# Patient Record
Sex: FEMALE | Race: WHITE | Employment: PART TIME | URBAN - METROPOLITAN AREA
[De-identification: names, ages, dates, MRNs, and addresses within clinical notes are randomized per-mention and may not be internally consistent; named-entity substitution may affect disease eponyms.]

---

## 2017-01-10 ASSESSMENT — PAIN SCALES - GENERAL: PAINLEVEL_OUTOF10: 9

## 2017-01-10 ASSESSMENT — PAIN DESCRIPTION - DESCRIPTORS: DESCRIPTORS: ACHING

## 2017-01-11 ENCOUNTER — HOSPITAL ENCOUNTER (EMERGENCY)
Age: 22
Discharge: HOME | End: 2017-01-11
Payer: COMMERCIAL

## 2017-01-11 VITALS
HEART RATE: 100 BPM | OXYGEN SATURATION: 100 % | BODY MASS INDEX: 22.71 KG/M2 | SYSTOLIC BLOOD PRESSURE: 106 MMHG | DIASTOLIC BLOOD PRESSURE: 68 MMHG | WEIGHT: 133 LBS | TEMPERATURE: 98.7 F | HEIGHT: 64 IN | RESPIRATION RATE: 18 BRPM

## 2017-01-11 DIAGNOSIS — J10.1 INFLUENZA A: Primary | ICD-10-CM

## 2017-01-11 LAB
ALBUMIN SERPL-MCNC: 3.6 G/DL (ref 3.9–4.9)
ALP BLD-CCNC: 42 U/L (ref 40–130)
ALT SERPL-CCNC: 10 U/L (ref 0–33)
ANION GAP SERPL CALCULATED.3IONS-SCNC: 12 MEQ/L (ref 7–13)
AST SERPL-CCNC: 15 U/L (ref 0–35)
BACTERIA: NORMAL /HPF
BASOPHILS ABSOLUTE: 0 K/UL (ref 0–0.2)
BASOPHILS RELATIVE PERCENT: 0.3 %
BILIRUB SERPL-MCNC: 0.2 MG/DL (ref 0–1.2)
BILIRUBIN URINE: NEGATIVE
BLOOD, URINE: NEGATIVE
BUN BLDV-MCNC: 4 MG/DL (ref 6–20)
CALCIUM SERPL-MCNC: 8.4 MG/DL (ref 8.6–10.2)
CHLORIDE BLD-SCNC: 99 MEQ/L (ref 98–107)
CLARITY: ABNORMAL
CO2: 21 MEQ/L (ref 22–29)
COLOR: YELLOW
CREAT SERPL-MCNC: 0.45 MG/DL (ref 0.5–0.9)
EOSINOPHILS ABSOLUTE: 0 K/UL (ref 0–0.7)
EOSINOPHILS RELATIVE PERCENT: 0.1 %
EPITHELIAL CELLS, UA: NORMAL /HPF
GFR AFRICAN AMERICAN: >60
GFR NON-AFRICAN AMERICAN: >60
GLOBULIN: 2.4 G/DL (ref 2.3–3.5)
GLUCOSE BLD-MCNC: 98 MG/DL (ref 74–109)
GLUCOSE URINE: NEGATIVE MG/DL
HCT VFR BLD CALC: 31.5 % (ref 37–47)
HEMOGLOBIN: 11.2 G/DL (ref 12–16)
KETONES, URINE: NEGATIVE MG/DL
LEUKOCYTE ESTERASE, URINE: ABNORMAL
LYMPHOCYTES ABSOLUTE: 0.7 K/UL (ref 1–4.8)
LYMPHOCYTES RELATIVE PERCENT: 7.4 %
MCH RBC QN AUTO: 31.9 PG (ref 27–31.3)
MCHC RBC AUTO-ENTMCNC: 35.5 % (ref 33–37)
MCV RBC AUTO: 90 FL (ref 82–100)
MONOCYTES ABSOLUTE: 1.3 K/UL (ref 0.2–0.8)
MONOCYTES RELATIVE PERCENT: 13.8 %
NEUTROPHILS ABSOLUTE: 7.2 K/UL (ref 1.4–6.5)
NEUTROPHILS RELATIVE PERCENT: 78.4 %
NITRITE, URINE: NEGATIVE
PDW BLD-RTO: 14.9 % (ref 11.5–14.5)
PH UA: 6 (ref 5–9)
PLATELET # BLD: 167 K/UL (ref 130–400)
POTASSIUM SERPL-SCNC: 3.4 MEQ/L (ref 3.5–5.1)
PROTEIN UA: NEGATIVE MG/DL
RAPID INFLUENZA  B AGN: NEGATIVE
RAPID INFLUENZA A AGN: POSITIVE
RBC # BLD: 3.5 M/UL (ref 4.2–5.4)
RBC UA: NORMAL /HPF (ref 0–2)
S PYO AG THROAT QL: NEGATIVE
SODIUM BLD-SCNC: 132 MEQ/L (ref 132–144)
SPECIFIC GRAVITY UA: 1.01 (ref 1–1.03)
TOTAL PROTEIN: 6 G/DL (ref 6.4–8.1)
URINE REFLEX TO CULTURE: YES
UROBILINOGEN, URINE: 1 E.U./DL
WBC # BLD: 9.1 K/UL (ref 4.8–10.8)
WBC UA: NORMAL /HPF (ref 0–5)

## 2017-01-11 PROCEDURE — 85025 COMPLETE CBC W/AUTO DIFF WBC: CPT

## 2017-01-11 PROCEDURE — 87880 STREP A ASSAY W/OPTIC: CPT

## 2017-01-11 PROCEDURE — 81001 URINALYSIS AUTO W/SCOPE: CPT

## 2017-01-11 PROCEDURE — 87081 CULTURE SCREEN ONLY: CPT

## 2017-01-11 PROCEDURE — 6370000000 HC RX 637 (ALT 250 FOR IP): Performed by: PHYSICIAN ASSISTANT

## 2017-01-11 PROCEDURE — 36415 COLL VENOUS BLD VENIPUNCTURE: CPT

## 2017-01-11 PROCEDURE — 2580000003 HC RX 258: Performed by: PHYSICIAN ASSISTANT

## 2017-01-11 PROCEDURE — 99283 EMERGENCY DEPT VISIT LOW MDM: CPT

## 2017-01-11 PROCEDURE — 87040 BLOOD CULTURE FOR BACTERIA: CPT

## 2017-01-11 PROCEDURE — 87086 URINE CULTURE/COLONY COUNT: CPT

## 2017-01-11 PROCEDURE — 80053 COMPREHEN METABOLIC PANEL: CPT

## 2017-01-11 PROCEDURE — 86403 PARTICLE AGGLUT ANTBDY SCRN: CPT

## 2017-01-11 RX ORDER — 0.9 % SODIUM CHLORIDE 0.9 %
1000 INTRAVENOUS SOLUTION INTRAVENOUS ONCE
Status: COMPLETED | OUTPATIENT
Start: 2017-01-11 | End: 2017-01-11

## 2017-01-11 RX ORDER — ACETAMINOPHEN 500 MG
1000 TABLET ORAL ONCE
Status: COMPLETED | OUTPATIENT
Start: 2017-01-11 | End: 2017-01-11

## 2017-01-11 RX ORDER — OSELTAMIVIR PHOSPHATE 75 MG/1
75 CAPSULE ORAL 2 TIMES DAILY
Qty: 10 CAPSULE | Refills: 0 | Status: SHIPPED | OUTPATIENT
Start: 2017-01-11 | End: 2017-01-16

## 2017-01-11 RX ORDER — OSELTAMIVIR PHOSPHATE 75 MG/1
75 CAPSULE ORAL ONCE
Status: COMPLETED | OUTPATIENT
Start: 2017-01-11 | End: 2017-01-11

## 2017-01-11 RX ORDER — ACETAMINOPHEN 500 MG
1000 TABLET ORAL EVERY 8 HOURS PRN
Qty: 120 TABLET | Refills: 3 | Status: SHIPPED | OUTPATIENT
Start: 2017-01-11 | End: 2018-10-30 | Stop reason: ALTCHOICE

## 2017-01-11 RX ADMIN — SODIUM CHLORIDE 1000 ML: 9 INJECTION, SOLUTION INTRAVENOUS at 00:24

## 2017-01-11 RX ADMIN — ACETAMINOPHEN 1000 MG: 500 TABLET ORAL at 00:24

## 2017-01-11 RX ADMIN — OSELTAMIVIR PHOSPHATE 75 MG: 75 CAPSULE ORAL at 00:53

## 2017-01-11 ASSESSMENT — ENCOUNTER SYMPTOMS
COUGH: 1
DIARRHEA: 0
VOMITING: 1
TROUBLE SWALLOWING: 0
RHINORRHEA: 1
SORE THROAT: 1
APNEA: 0
ABDOMINAL PAIN: 0
EYE PAIN: 0
WHEEZING: 0
NAUSEA: 1
ALLERGIC/IMMUNOLOGIC NEGATIVE: 1
COLOR CHANGE: 0

## 2017-01-11 ASSESSMENT — PAIN SCALES - GENERAL: PAINLEVEL_OUTOF10: 9

## 2017-01-12 LAB — URINE CULTURE, ROUTINE: NORMAL

## 2017-01-13 LAB — S PYO THROAT QL CULT: NORMAL

## 2017-01-16 LAB
BLOOD CULTURE, ROUTINE: NORMAL
CULTURE, BLOOD 2: NORMAL

## 2018-04-27 PROBLEM — G89.29 CHRONIC MIDLINE LOW BACK PAIN WITHOUT SCIATICA: Status: ACTIVE | Noted: 2018-04-27

## 2018-04-27 PROBLEM — M54.50 CHRONIC MIDLINE LOW BACK PAIN WITHOUT SCIATICA: Status: ACTIVE | Noted: 2018-04-27

## 2018-09-29 ENCOUNTER — HOSPITAL ENCOUNTER (OUTPATIENT)
Dept: MRI IMAGING | Age: 23
Discharge: HOME | End: 2018-10-01
Payer: COMMERCIAL

## 2018-09-29 DIAGNOSIS — M54.50 CHRONIC MIDLINE LOW BACK PAIN WITHOUT SCIATICA: ICD-10-CM

## 2018-09-29 DIAGNOSIS — R20.0 RIGHT LEG NUMBNESS: ICD-10-CM

## 2018-09-29 DIAGNOSIS — G89.29 CHRONIC MIDLINE LOW BACK PAIN WITHOUT SCIATICA: ICD-10-CM

## 2018-09-29 PROCEDURE — 72148 MRI LUMBAR SPINE W/O DYE: CPT

## 2018-10-30 ENCOUNTER — OFFICE VISIT (OUTPATIENT)
Dept: FAMILY MEDICINE CLINIC | Age: 23
End: 2018-10-30
Payer: COMMERCIAL

## 2018-10-30 VITALS
SYSTOLIC BLOOD PRESSURE: 112 MMHG | OXYGEN SATURATION: 97 % | WEIGHT: 153.8 LBS | TEMPERATURE: 97.7 F | HEART RATE: 103 BPM | RESPIRATION RATE: 12 BRPM | BODY MASS INDEX: 27.25 KG/M2 | HEIGHT: 63 IN | DIASTOLIC BLOOD PRESSURE: 76 MMHG

## 2018-10-30 DIAGNOSIS — J06.9 VIRAL URI WITH COUGH: Primary | ICD-10-CM

## 2018-10-30 PROCEDURE — 1036F TOBACCO NON-USER: CPT | Performed by: NURSE PRACTITIONER

## 2018-10-30 PROCEDURE — 99203 OFFICE O/P NEW LOW 30 MIN: CPT | Performed by: NURSE PRACTITIONER

## 2018-10-30 PROCEDURE — G8484 FLU IMMUNIZE NO ADMIN: HCPCS | Performed by: NURSE PRACTITIONER

## 2018-10-30 PROCEDURE — G8427 DOCREV CUR MEDS BY ELIG CLIN: HCPCS | Performed by: NURSE PRACTITIONER

## 2018-10-30 PROCEDURE — G8419 CALC BMI OUT NRM PARAM NOF/U: HCPCS | Performed by: NURSE PRACTITIONER

## 2018-10-30 RX ORDER — BUTALBITAL, ACETAMINOPHEN AND CAFFEINE 50; 325; 40 MG/1; MG/1; MG/1
TABLET ORAL
Refills: 0 | COMMUNITY
Start: 2018-08-31

## 2018-10-30 RX ORDER — FLUTICASONE PROPIONATE 50 MCG
1 SPRAY, SUSPENSION (ML) NASAL 2 TIMES DAILY
Qty: 1 BOTTLE | Refills: 1 | Status: SHIPPED | OUTPATIENT
Start: 2018-10-30

## 2018-10-30 RX ORDER — CETIRIZINE HYDROCHLORIDE, PSEUDOEPHEDRINE HYDROCHLORIDE 5; 120 MG/1; MG/1
1 TABLET, FILM COATED, EXTENDED RELEASE ORAL 2 TIMES DAILY
Qty: 60 TABLET | Refills: 0 | Status: SHIPPED | OUTPATIENT
Start: 2018-10-30 | End: 2018-11-29

## 2018-10-30 RX ORDER — DEXTROMETHORPHAN HYDROBROMIDE AND PROMETHAZINE HYDROCHLORIDE 15; 6.25 MG/5ML; MG/5ML
5 SYRUP ORAL 4 TIMES DAILY PRN
Qty: 180 ML | Refills: 0 | Status: SHIPPED | OUTPATIENT
Start: 2018-10-30 | End: 2018-11-06

## 2018-10-30 RX ORDER — BENZONATATE 100 MG/1
100 CAPSULE ORAL 3 TIMES DAILY PRN
Qty: 21 CAPSULE | Refills: 0 | Status: SHIPPED | OUTPATIENT
Start: 2018-10-30 | End: 2018-11-06

## 2018-10-30 ASSESSMENT — ENCOUNTER SYMPTOMS
SHORTNESS OF BREATH: 0
HOARSE VOICE: 1
SINUS PRESSURE: 1
COUGH: 1
VOMITING: 0
TROUBLE SWALLOWING: 0
SINUS COMPLAINT: 1
DIARRHEA: 0
ABDOMINAL PAIN: 0
EYE PAIN: 0
EYE REDNESS: 0
EYE DISCHARGE: 0
SWOLLEN GLANDS: 0
EYE ITCHING: 0
PHOTOPHOBIA: 0
RHINORRHEA: 1
NAUSEA: 0
SORE THROAT: 0

## 2018-10-30 ASSESSMENT — PATIENT HEALTH QUESTIONNAIRE - PHQ9
2. FEELING DOWN, DEPRESSED OR HOPELESS: 0
1. LITTLE INTEREST OR PLEASURE IN DOING THINGS: 0
SUM OF ALL RESPONSES TO PHQ QUESTIONS 1-9: 0
SUM OF ALL RESPONSES TO PHQ QUESTIONS 1-9: 0
SUM OF ALL RESPONSES TO PHQ9 QUESTIONS 1 & 2: 0

## 2018-10-30 NOTE — LETTER
[facility]  [address] [facility] [address]  Phone: [phone]  Fax: [phone]    JES Escoto CNP        October 30, 2018     Patient: Alejandra Black   YOB: 1995   Date of Visit: 10/30/2018       To Whom it May Concern:    Yas Askew was seen in my clinic on 10/30/2018. She may return to work on 10/31/2018. If you have any questions or concerns, please don't hesitate to call.     Sincerely,         JES Escoto CNP

## 2018-10-30 NOTE — PROGRESS NOTES
Subjective     Monse Lepe 24 y.o. female presents 10/30/18 with   Chief Complaint   Patient presents with    Cough     x 2 days.  Congestion    Headache       Sinus Problem   This is a new problem. Episode onset: about 4 days ago; cough about 2 days ago. The problem has been gradually worsening since onset. There has been no fever. She is experiencing no pain. Associated symptoms include chills, congestion, coughing, diaphoresis, headaches, a hoarse voice and sinus pressure (Slight). Pertinent negatives include no ear pain, neck pain, shortness of breath, sneezing, sore throat or swollen glands. Treatments tried: Dayquil/Nyquil, Vitamin C. The treatment provided mild relief. Reviewed the following history:    Past Medical History:   Diagnosis Date    Endometriosis      Past Surgical History:   Procedure Laterality Date    APPENDECTOMY       History reviewed. No pertinent family history. Allergies   Allergen Reactions    Acetaminophen-Pamabrom Itching    Midol [Acetaminophen]     Midol [Ibuprofen]        Current Outpatient Prescriptions   Medication Sig Dispense Refill    butalbital-acetaminophen-caffeine (FIORICET, ESGIC) -40 MG per tablet TK ONE T PO BID PRN  0    medroxyPROGESTERone (DEPO-PROVERA) 400 MG/ML SUSP Inject 400 mg into the muscle      fluticasone (FLONASE) 50 MCG/ACT nasal spray 1 spray by Nasal route 2 times daily 1 Bottle 1    cetirizine-psuedoephedrine (ZYRTEC-D) 5-120 MG per extended release tablet Take 1 tablet by mouth 2 times daily 60 tablet 0    promethazine-dextromethorphan (PROMETHAZINE-DM) 6.25-15 MG/5ML syrup Take 5 mLs by mouth 4 times daily as needed for Cough 180 mL 0    benzonatate (TESSALON PERLES) 100 MG capsule Take 1 capsule by mouth 3 times daily as needed for Cough 21 capsule 0    gabapentin (NEURONTIN) 300 MG capsule Take 1 capsule by mouth daily as needed (pain) for up to 30 days. . 30 capsule 0    tiZANidine (ZANAFLEX) 4 MG tablet TAKE 1

## 2021-03-23 ENCOUNTER — HOSPITAL ENCOUNTER (EMERGENCY)
Age: 26
Discharge: HOME | End: 2021-03-23
Payer: COMMERCIAL

## 2021-03-23 VITALS
TEMPERATURE: 98 F | HEIGHT: 63 IN | BODY MASS INDEX: 26.93 KG/M2 | HEART RATE: 71 BPM | SYSTOLIC BLOOD PRESSURE: 111 MMHG | DIASTOLIC BLOOD PRESSURE: 78 MMHG | RESPIRATION RATE: 19 BRPM | WEIGHT: 152 LBS

## 2021-03-23 DIAGNOSIS — B37.31 VAGINAL CANDIDIASIS: Primary | ICD-10-CM

## 2021-03-23 LAB
BACTERIA: NEGATIVE /HPF
BILIRUBIN URINE: NEGATIVE
BLOOD, URINE: NEGATIVE
CLARITY: CLEAR
COLOR: YELLOW
EPITHELIAL CELLS, UA: ABNORMAL /HPF (ref 0–5)
GLUCOSE URINE: NEGATIVE MG/DL
HCG, URINE, POC: NEGATIVE
HYALINE CASTS: ABNORMAL /HPF (ref 0–5)
KETONES, URINE: ABNORMAL MG/DL
LEUKOCYTE ESTERASE, URINE: ABNORMAL
Lab: 0
NEGATIVE QC PASS/FAIL: NORMAL
NITRITE, URINE: NEGATIVE
PH UA: 7 (ref 5–9)
POSITIVE QC PASS/FAIL: NORMAL
PROTEIN UA: ABNORMAL MG/DL
RBC UA: ABNORMAL /HPF (ref 0–5)
SPECIFIC GRAVITY UA: 1.03 (ref 1–1.03)
URINE REFLEX TO CULTURE: YES
UROBILINOGEN, URINE: 1 E.U./DL
WBC UA: ABNORMAL /HPF (ref 0–5)

## 2021-03-23 PROCEDURE — 87077 CULTURE AEROBIC IDENTIFY: CPT

## 2021-03-23 PROCEDURE — 6370000000 HC RX 637 (ALT 250 FOR IP): Performed by: NURSE PRACTITIONER

## 2021-03-23 PROCEDURE — 87086 URINE CULTURE/COLONY COUNT: CPT

## 2021-03-23 PROCEDURE — 99283 EMERGENCY DEPT VISIT LOW MDM: CPT

## 2021-03-23 PROCEDURE — 81001 URINALYSIS AUTO W/SCOPE: CPT

## 2021-03-23 RX ORDER — FLUCONAZOLE 200 MG/1
200 TABLET ORAL DAILY
Qty: 1 TABLET | Refills: 0 | Status: SHIPPED | OUTPATIENT
Start: 2021-03-23

## 2021-03-23 RX ORDER — FLUCONAZOLE 100 MG/1
200 TABLET ORAL ONCE
Status: COMPLETED | OUTPATIENT
Start: 2021-03-23 | End: 2021-03-23

## 2021-03-23 RX ADMIN — FLUCONAZOLE 200 MG: 100 TABLET ORAL at 19:24

## 2021-03-23 ASSESSMENT — ENCOUNTER SYMPTOMS
TROUBLE SWALLOWING: 0
WHEEZING: 0
SORE THROAT: 0
DIARRHEA: 0
CHEST TIGHTNESS: 0
NAUSEA: 0
SINUS PAIN: 0
BACK PAIN: 0
VOMITING: 0
CONSTIPATION: 0
ABDOMINAL DISTENTION: 0
SINUS PRESSURE: 0
RHINORRHEA: 0
SHORTNESS OF BREATH: 0
ABDOMINAL PAIN: 0
COLOR CHANGE: 0
COUGH: 0

## 2021-03-23 NOTE — ED NOTES
D/C instructions given to patient no questions ask. Patient verbalized understanding and ambulated from ED without any complications.      Daniel Tobar RN  03/23/21 1996

## 2021-03-23 NOTE — ED PROVIDER NOTES
[address] [facility] ED  EMERGENCY DEPARTMENT ENCOUNTER      Pt Name: Homar Fernandez  MRN: [id number]  [place] 1995  Date of evaluation: 3/23/2021  Provider: JES Garcia CNP    CHIEF COMPLAINT       Chief Complaint   Patient presents with    Vaginitis     pt has a rash and discharge. HISTORY OF PRESENT ILLNESS   (Location/Symptom, Timing/Onset,Context/Setting, Quality, Duration, Modifying Factors, Severity)  Note limiting factors. Homar Fernandez is a 31 y.o. female who presents to the emergency department for complaint of 5 days of developing rash on the vaginal area with white discharge. Patient states that she is certain she has a yeast infection. She states she had 1 month ago which she treated with Diflucan successfully. She states however that she recently had a tooth infection and took 7 days of amoxicillin and the symptoms started on the fifth and sixth day of the amoxicillin. She states she is a history of getting vaginal yeast infections with the use of antibiotics. She denies any abdominal pain or discomfort denies dysuria urgency or frequency denies any symptoms of urinary tract infection. She denies any recent diarrhea. She states that any of the discomfort is all located in the vaginal area due to the rash and redness that has occurred with prior yeast infections. She states she was recently seen by her OB/GYN just prior to starting the antibiotics and had STD testing for examination and denies any exposure to STDs at this time. Nursing Notes were reviewed. REVIEW OF SYSTEMS    (2-9 systems for level 4, 10 or more for level 5)     Review of Systems   Constitutional: Negative for activity change, appetite change, chills, diaphoresis, fatigue and fever. HENT: Negative for congestion, ear pain, postnasal drip, rhinorrhea, sinus pressure, sinus pain, sore throat and trouble swallowing. Eyes: Negative for visual disturbance.    Respiratory: Negative for cough, chest tightness, shortness of breath and wheezing. Cardiovascular: Negative for chest pain and palpitations. Gastrointestinal: Negative for abdominal distention, abdominal pain, constipation, diarrhea, nausea and vomiting. Genitourinary: Positive for vaginal discharge and vaginal pain. Negative for decreased urine volume, difficulty urinating, dysuria, flank pain, frequency, hematuria, menstrual problem, urgency and vaginal bleeding. Musculoskeletal: Negative for arthralgias, back pain, myalgias, neck pain and neck stiffness. Skin: Negative for color change and rash. Neurological: Negative for dizziness, tremors, seizures, syncope, speech difficulty, weakness, light-headedness, numbness and headaches. Except as noted above the remainder of the review of systems was reviewed and negative.        PAST MEDICAL HISTORY     Past Medical History:   Diagnosis Date    Endometriosis      Past Surgical History:   Procedure Laterality Date    APPENDECTOMY       Social History     Socioeconomic History    Marital status: Single     Spouse name: None    Number of children: None    Years of education: None    Highest education level: None   Occupational History    None   Social Needs    Financial resource strain: None    Food insecurity     Worry: None     Inability: None    Transportation needs     Medical: None     Non-medical: None   Tobacco Use    Smoking status: Former Smoker    Smokeless tobacco: Never Used   Substance and Sexual Activity    Alcohol use: No    Drug use: No    Sexual activity: Yes     Partners: Male   Lifestyle    Physical activity     Days per week: None     Minutes per session: None    Stress: None   Relationships    Social connections     Talks on phone: None     Gets together: None     Attends Scientologist service: None     Active member of club or organization: None     Attends meetings of clubs or organizations: None     Relationship status: None    Intimate partner violence     Fear of current or ex partner: None     Emotionally abused: None     Physically abused: None     Forced sexual activity: None   Other Topics Concern    None   Social History Narrative    None       SCREENINGS             PHYSICAL EXAM    (up to 7 for level 4, 8 or more for level 5)     ED Triage Vitals [03/23/21 1837]   BP Temp Temp src Pulse Resp SpO2 Height Weight   111/78 98 °F (36.7 °C) -- 71 19 -- 5' 3\" (1.6 m) 152 lb (68.9 kg)       Physical Exam  Constitutional:       General: She is not in acute distress. Appearance: Normal appearance. She is normal weight. She is not ill-appearing, toxic-appearing or diaphoretic. HENT:      Head: Normocephalic and atraumatic. Right Ear: External ear normal.      Left Ear: External ear normal.   Eyes:      General:         Right eye: No discharge. Left eye: No discharge. Conjunctiva/sclera: Conjunctivae normal.      Pupils: Pupils are equal, round, and reactive to light. Neck:      Musculoskeletal: Normal range of motion and neck supple. No neck rigidity or muscular tenderness. Cardiovascular:      Rate and Rhythm: Normal rate and regular rhythm. Pulses: Normal pulses. Pulmonary:      Effort: Pulmonary effort is normal.      Breath sounds: Normal breath sounds. Abdominal:      General: Bowel sounds are normal. There is no distension. Palpations: Abdomen is soft. There is no mass. Tenderness: There is no abdominal tenderness. There is no guarding or rebound. Hernia: No hernia is present. Musculoskeletal: Normal range of motion. General: No tenderness or signs of injury. Skin:     General: Skin is warm and dry. Capillary Refill: Capillary refill takes less than 2 seconds. Neurological:      General: No focal deficit present. Mental Status: She is alert. Mental status is at baseline. Cranial Nerves: No cranial nerve deficit. Sensory: No sensory deficit.       Motor: No weakness. Coordination: Coordination normal.         RESULTS     EKG: All EKG's are interpreted by the Emergency Department Physician who either signs or Co-signsthis chart in the absence of a cardiologist.        RADIOLOGY:   Lalla Cass such as CT, Ultrasound and MRI are read by the radiologist. Plain radiographic images are visualized and preliminarily interpreted by the emergency physician with the below findings:        Interpretation per the Radiologist below, if available at the time ofthis note:    No orders to display         ED BEDSIDE ULTRASOUND:   Performed by ED Physician - none    LABS:  Labs Reviewed   URINE RT REFLEX TO CULTURE   POC PREGNANCY UR-QUAL       All other labs were within normal range or not returned as of this dictation. EMERGENCY DEPARTMENT COURSE and DIFFERENTIAL DIAGNOSIS/MDM:   Vitals:    Vitals:    03/23/21 1837   BP: 111/78   Pulse: 71   Resp: 19   Temp: 98 °F (36.7 °C)   Weight: 152 lb (68.9 kg)   Height: 5' 3\" (1.6 m)            MDM patient is afebrile nontoxic no acute distress hemodynamically stable. Patient does not have symptoms of a urinary tract infection she does however have classic symptoms presentation of vaginal yeast infection. Vaginal examination was not performed as the patient recently had one OB/GYN and given the presentation and history of yeast infections her symptoms are highly suggestive of a yeast infection secondary to these antibiotics. Patient states she will follow up with her OB/GYN for further evaluation of necessary. She be given a dose of Diflucan in the ER and sent home with a second dose to take in 3 days. Follow-up with primary care and OB/GYN this is possible return to the ER for any onset of new concerns and worsening condition specifically abdominal pain or fevers of abdominal pain. Patient verbalized understandable given instruction education.     CRITICAL CARE TIME       CONSULTS:  None    PROCEDURES:  Unless otherwise noted below, none     Procedures    FINAL IMPRESSION      1.  Vaginal candidiasis          DISPOSITION/PLAN   DISPOSITION Discharge - Pending Orders Complete 03/23/2021 07:12:09 PM      PATIENT REFERRED TO:  Farhad Denis MD  [address] [address] [place]  [phone]    Call in 1 day        DISCHARGE MEDICATIONS:  New Prescriptions    FLUCONAZOLE (DIFLUCAN) 200 MG TABLET    Take 1 tablet by mouth daily In 3 days for continued symtoms          (Please notethat portions of this note were completed with a voice recognition program.  Efforts were made to edit the dictations but occasionally words are mis-transcribed.)    JES Cuevas CNP (electronically signed)  Attending Emergency Physician         JES Cuevas CNP  03/23/21 5990

## 2021-03-25 LAB
ORGANISM: ABNORMAL
URINE CULTURE, ROUTINE: ABNORMAL

## 2021-03-31 ENCOUNTER — HOSPITAL ENCOUNTER (EMERGENCY)
Age: 26
Discharge: HOME | End: 2021-03-31
Payer: COMMERCIAL

## 2021-03-31 ENCOUNTER — APPOINTMENT (OUTPATIENT)
Dept: GENERAL RADIOLOGY | Age: 26
End: 2021-03-31
Payer: COMMERCIAL

## 2021-03-31 VITALS
SYSTOLIC BLOOD PRESSURE: 118 MMHG | BODY MASS INDEX: 30.04 KG/M2 | TEMPERATURE: 98.5 F | HEART RATE: 74 BPM | RESPIRATION RATE: 18 BRPM | HEIGHT: 60 IN | DIASTOLIC BLOOD PRESSURE: 74 MMHG | WEIGHT: 153 LBS | OXYGEN SATURATION: 100 %

## 2021-03-31 DIAGNOSIS — M79.602 LEFT ARM PAIN: Primary | ICD-10-CM

## 2021-03-31 PROCEDURE — 99284 EMERGENCY DEPT VISIT MOD MDM: CPT

## 2021-03-31 PROCEDURE — 6370000000 HC RX 637 (ALT 250 FOR IP): Performed by: STUDENT IN AN ORGANIZED HEALTH CARE EDUCATION/TRAINING PROGRAM

## 2021-03-31 PROCEDURE — 73030 X-RAY EXAM OF SHOULDER: CPT

## 2021-03-31 RX ORDER — METHYLPREDNISOLONE 4 MG/1
TABLET ORAL
Qty: 21 TABLET | Refills: 0 | Status: SHIPPED | OUTPATIENT
Start: 2021-03-31 | End: 2021-04-06

## 2021-03-31 RX ORDER — HYDROCODONE BITARTRATE AND ACETAMINOPHEN 5; 325 MG/1; MG/1
1 TABLET ORAL EVERY 6 HOURS PRN
Qty: 10 TABLET | Refills: 0 | Status: SHIPPED | OUTPATIENT
Start: 2021-03-31 | End: 2021-04-03

## 2021-03-31 RX ORDER — TRAMADOL HYDROCHLORIDE 50 MG/1
50 TABLET ORAL ONCE
Status: COMPLETED | OUTPATIENT
Start: 2021-03-31 | End: 2021-03-31

## 2021-03-31 RX ADMIN — TRAMADOL HYDROCHLORIDE 50 MG: 50 TABLET, FILM COATED ORAL at 21:21

## 2021-03-31 ASSESSMENT — PAIN DESCRIPTION - PAIN TYPE: TYPE: ACUTE PAIN

## 2021-03-31 ASSESSMENT — PAIN DESCRIPTION - DESCRIPTORS: DESCRIPTORS: ACHING

## 2021-03-31 ASSESSMENT — PAIN SCALES - GENERAL: PAINLEVEL_OUTOF10: 10

## 2021-04-01 ASSESSMENT — ENCOUNTER SYMPTOMS
VOMITING: 0
COUGH: 0
WHEEZING: 0
SHORTNESS OF BREATH: 0
ABDOMINAL PAIN: 0
NAUSEA: 0
PHOTOPHOBIA: 0

## 2021-04-01 NOTE — ED TRIAGE NOTES
Patient to ED with c/o left arm pain   Onset of pain 1630  Patient states she was going to work and arm started hurting and \"turned blue\".   Patient rates pain 10/10  Patient denies taking any meds to relieve pain  Arm is pink warm and dry

## 2021-04-01 NOTE — ED PROVIDER NOTES
[address] [facility] ED  EMERGENCY DEPARTMENT ENCOUNTER      Pt Name: Kerrie Anthony  MRN: [id number]  [place] 1995  Date of evaluation: 3/31/2021  Provider: Hunter Crowe PA-C    CHIEF COMPLAINT       Chief Complaint   Patient presents with    Arm Pain         HISTORY OF PRESENT ILLNESS   (Location/Symptom, Timing/Onset, Context/Setting, Quality, Duration, Modifying Factors, Severity)  Note limiting factors. Kerrie Anthony is a 31 y.o. female who per chart review has pmhx of chronic back pain, endometriosis, appendectomy presents to the emergency department with gradual onset, constant, moderate, 9/10 diffuse L arm pain worse in the shoulder region that began around 1630 before going to work. She states it looked as if the arm \"changed colors\" for a short period of time and then spontaneously returned to normal pink color. She states she had a tingling sensation in the L hand as well that has since resolved. Pain is worse with movement and palpation. She has not tried anything for her sx. No hx of similar. No recent injuries. She works as a nurses aid and lifts patients. She denies hx of blood clots. No recent travel, hospitalizations, periods of immobilization or surgeries. She denies numbness, weakness, fever, chills, redness, swelling, deformities, neck or back pain, chest pain, sob. HPI    Nursing Notes were reviewed. REVIEW OF SYSTEMS    (2-9 systems for level 4, 10 or more for level 5)     Review of Systems   Constitutional: Negative for chills and fever. HENT: Negative for congestion. Eyes: Negative for photophobia. Respiratory: Negative for cough, shortness of breath and wheezing. Cardiovascular: Negative for chest pain and palpitations. Gastrointestinal: Negative for abdominal pain, nausea and vomiting. Genitourinary: Negative for dysuria, frequency and hematuria.    Musculoskeletal: Negative for myalgias.        + L arm pain   Allergic/Immunologic: Negative for Substance and Sexual Activity    Alcohol use: No    Drug use: No    Sexual activity: Yes     Partners: Male   Lifestyle    Physical activity     Days per week: None     Minutes per session: None    Stress: None   Relationships    Social connections     Talks on phone: None     Gets together: None     Attends Denominational service: None     Active member of club or organization: None     Attends meetings of clubs or organizations: None     Relationship status: None    Intimate partner violence     Fear of current or ex partner: None     Emotionally abused: None     Physically abused: None     Forced sexual activity: None   Other Topics Concern    None   Social History Narrative    None       SCREENINGS                        PHYSICAL EXAM    (up to 7 for level 4, 8 or more for level 5)     ED Triage Vitals [03/31/21 2108]   BP Temp Temp Source Pulse Resp SpO2 Height Weight   112/67 98.5 °F (36.9 °C) Oral 74 18 100 % 5' (1.524 m) 153 lb (69.4 kg)       Physical Exam  Constitutional:       General: She is not in acute distress. Appearance: She is well-developed. She is not toxic-appearing. HENT:      Head: Normocephalic and atraumatic. Nose: Nose normal.      Mouth/Throat:      Mouth: Mucous membranes are moist.   Eyes:      Pupils: Pupils are equal, round, and reactive to light. Neck:      Musculoskeletal: Normal range of motion. Cardiovascular:      Rate and Rhythm: Normal rate and regular rhythm. Heart sounds: No murmur. No friction rub. No gallop. Pulmonary:      Effort: Pulmonary effort is normal.      Breath sounds: Normal breath sounds. Abdominal:      General: There is no distension. Tenderness: There is no abdominal tenderness. Musculoskeletal:         General: No swelling. Left shoulder: She exhibits tenderness (diffuse).  She exhibits normal range of motion, no bony tenderness, no swelling, no effusion, no crepitus, no deformity, no laceration, no pain, no spasm, normal pulse and normal strength. Left elbow: Normal.      Left wrist: Normal.      Comments: LUE: no deformity, color change, erythema, warmth, streaking, swelling. Pulses 2+. Cap refill <2 seconds. Pt has full ROM of all upper extremity joints. Strength 5/5. Sensation intact. Good  strength. Neurovascularly intact. Diffuse ttp of shoulder. Skin:     General: Skin is warm and dry. Neurological:      Mental Status: She is alert and oriented to person, place, and time. DIAGNOSTIC RESULTS     EKG: All EKG's are interpreted by the Emergency Department Physician who either signs or Co-signs this chart in the absence of a cardiologist.        RADIOLOGY:   Non-plain film images such as CT, Ultrasound and MRI are read by the radiologist. Plain radiographic images are visualized and preliminarily interpreted by the emergency physician with the below findings:        Interpretation per the Radiologist below, if available at the time of this note:    XR SHOULDER LEFT (MIN 2 VIEWS)    (Results Pending)         ED BEDSIDE ULTRASOUND:   Performed by ED Physician - none    LABS:  Labs Reviewed - No data to display    All other labs were within normal range or not returned as of this dictation. EMERGENCY DEPARTMENT COURSE and DIFFERENTIAL DIAGNOSIS/MDM:   Vitals:    Vitals:    03/31/21 2108 03/31/21 2219   BP: 112/67 118/74   Pulse: 74 74   Resp: 18 18   Temp: 98.5 °F (36.9 °C) 98.5 °F (36.9 °C)   TempSrc: Oral Oral   SpO2: 100% 100%   Weight: 153 lb (69.4 kg)    Height: 5' (1.524 m)        MDM    Pt is a 32 yo F who presents to the ED with L arm pain. She is afebrile and HD stable. She was given po tramadol in the ED. Xray of the L shoulder is negative for acute abnormality. Exam is generally unremarkable. Neurovascularly intact. No deformities. No swelling, color change. ROM, strength, sensation intact. Noted to be moving all extremities without difficulty. Suspect possible brachial plexus radiculopathy. Less concern for DVT or arterial occulusion. Less concern for infection. Pt reassessed with improvement of pain. She is non toxic appearing with stable vitals, stable for discharge. Given script for medrol and norco. Referred to neurology for f/u in 1-2 days. Return to the ED for worsening sx. Given warning signs for which she should return. Pt understands and agrees to plan, all questions answered. REASSESSMENT          CRITICAL CARE TIME   Total Critical Care time was 0 minutes, excluding separately reportable procedures. There was a high probability of clinically significant/life threatening deterioration in the patient's condition which required my urgent intervention. CONSULTS:  None    PROCEDURES:  Unless otherwise noted below, none     Procedures        FINAL IMPRESSION      1. Left arm pain          DISPOSITION/PLAN   DISPOSITION Decision To Discharge 03/31/2021 09:40:17 PM      PATIENT REFERRED TO:  Edson Starkey MD  [address] [facility]  [phone]    Schedule an appointment as soon as possible for a visit   As needed, If symptoms worsen    Kojo Mike MD  [address] [place] [address] [address] [facility]  [phone]    Schedule an appointment as soon as possible for a visit in 1 day        DISCHARGE MEDICATIONS:  Discharge Medication List as of 3/31/2021  9:45 PM      START taking these medications    Details   methylPREDNISolone (MEDROL, SHIRA,) 4 MG tablet Take 24 mg (6 tablets) by mouth x 1 and then decrease by 4 mg (1 tablet) each day for a total of 6 days. , Disp-21 tablet, R-0Print      HYDROcodone-acetaminophen (NORCO) 5-325 MG per tablet Take 1 tablet by mouth every 6 hours as needed for Pain for up to 3 days. Intended supply: 3 days. Take lowest dose possible to manage pain, Disp-10 tablet, R-0Print           Controlled Substances Monitoring:     RX Monitoring 12/27/2018   Attestation The Prescription Monitoring Report for this patient was reviewed today. Periodic Controlled Substance Monitoring -       (Please note that portions of this note were completed with a voice recognition program.  Efforts were made to edit the dictations but occasionally words are mis-transcribed.)    Sofya Barry PA-C (electronically signed)             Sofya Barry PA-C  04/01/21 [facility] [place] [id number]  04/01/21 3011

## 2021-08-03 ENCOUNTER — HOSPITAL ENCOUNTER (EMERGENCY)
Age: 26
Discharge: HOME | End: 2021-08-03
Attending: EMERGENCY MEDICINE
Payer: COMMERCIAL

## 2021-08-03 VITALS
RESPIRATION RATE: 17 BRPM | DIASTOLIC BLOOD PRESSURE: 72 MMHG | OXYGEN SATURATION: 100 % | TEMPERATURE: 97.6 F | SYSTOLIC BLOOD PRESSURE: 111 MMHG | HEIGHT: 63 IN | WEIGHT: 149 LBS | BODY MASS INDEX: 26.4 KG/M2 | HEART RATE: 88 BPM

## 2021-08-03 DIAGNOSIS — N93.8 DYSFUNCTIONAL UTERINE BLEEDING: Primary | ICD-10-CM

## 2021-08-03 LAB
BACTERIA: ABNORMAL /HPF
BILIRUBIN URINE: NEGATIVE
BLOOD, URINE: NEGATIVE
CLARITY: ABNORMAL
COLOR: YELLOW
EPITHELIAL CELLS, UA: ABNORMAL /HPF (ref 0–5)
GLUCOSE URINE: NEGATIVE MG/DL
HCG, URINE, POC: NEGATIVE
HYALINE CASTS: ABNORMAL /HPF (ref 0–5)
KETONES, URINE: NEGATIVE MG/DL
LEUKOCYTE ESTERASE, URINE: ABNORMAL
Lab: NORMAL
NEGATIVE QC PASS/FAIL: NORMAL
NITRITE, URINE: NEGATIVE
PH UA: 5.5 (ref 5–9)
POSITIVE QC PASS/FAIL: NORMAL
PROTEIN UA: NEGATIVE MG/DL
RBC UA: ABNORMAL /HPF (ref 0–5)
SPECIFIC GRAVITY UA: 1.02 (ref 1–1.03)
URINE REFLEX TO CULTURE: ABNORMAL
UROBILINOGEN, URINE: 1 E.U./DL
WBC UA: ABNORMAL /HPF (ref 0–5)

## 2021-08-03 PROCEDURE — 99283 EMERGENCY DEPT VISIT LOW MDM: CPT

## 2021-08-03 PROCEDURE — 81001 URINALYSIS AUTO W/SCOPE: CPT

## 2021-08-03 ASSESSMENT — PAIN DESCRIPTION - DESCRIPTORS: DESCRIPTORS: HEADACHE

## 2021-08-03 ASSESSMENT — PAIN DESCRIPTION - LOCATION: LOCATION: HEAD

## 2021-08-03 ASSESSMENT — PAIN SCALES - GENERAL: PAINLEVEL_OUTOF10: 1

## 2021-08-03 NOTE — ED PROVIDER NOTES
[address] [facility] ED  EMERGENCY MEDICINE     Pt Name: Ray Edge  MRN: [id number]  Ross 1995  Date of evaluation: 8/3/2021  PCP:    Toi Amin MD  Provider: León Begum, [address] [place] [address]       Chief Complaint   Patient presents with    Vaginal Bleeding     vaginal bleeding last week for 3 days which is now subsided        HISTORY OF PRESENT ILLNESS    HPI     43-year-old female no past medical history presents to the emergency department with complaint of 3 days of vaginal bleeding that happened last week. She states that she is having increased fatigue over the past week and a half. Patient denies possibility of pregnancy. She states that she is not on any OCPs. States that she has a history of endometriosis. States that this abnormal bleeding only happen 1 time. It has since stopped. Denies any associated symptoms including lightheadedness, dizziness, chest pain, shortness of breath, abdominal pain or cramping. He has not followed with an OB/GYN for a year. Triage notes and Nursing notes were reviewed by myself. Any discrepancies are addressed above. PAST MEDICAL HISTORY     Past Medical History:   Diagnosis Date    Endometriosis        SURGICAL HISTORY       Past Surgical History:   Procedure Laterality Date    APPENDECTOMY         CURRENT MEDICATIONS       Previous Medications    BUTALBITAL-ACETAMINOPHEN-CAFFEINE (FIORICET, ESGIC) -40 MG PER TABLET    TK ONE T PO BID PRN    FLUCONAZOLE (DIFLUCAN) 200 MG TABLET    Take 1 tablet by mouth daily In 3 days for continued symtoms    FLUTICASONE (FLONASE) 50 MCG/ACT NASAL SPRAY    1 spray by Nasal route 2 times daily    GABAPENTIN (NEURONTIN) 300 MG CAPSULE    Take 1 capsule by mouth daily as needed (pain) for up to 30 days. Talib Hardy     MEDROXYPROGESTERONE (DEPO-PROVERA) 400 MG/ML SUSP    Inject 400 mg into the muscle    TIZANIDINE (ZANAFLEX) 4 MG TABLET    TAKE 1 TABLET BY MOUTH EVERY NIGHT AS NEEDED FOR SPASMS ALLERGIES       Allergies   Allergen Reactions    Acetaminophen-Pamabrom Itching    Midol [Acetaminophen]     Midol [Ibuprofen]        FAMILY HISTORY     History reviewed. No pertinent family history. SOCIAL HISTORY       Social History     Socioeconomic History    Marital status: Single     Spouse name: None    Number of children: None    Years of education: None    Highest education level: None   Occupational History    None   Tobacco Use    Smoking status: Former Smoker    Smokeless tobacco: Never Used   Substance and Sexual Activity    Alcohol use: No    Drug use: No    Sexual activity: Yes     Partners: Male   Other Topics Concern    None   Social History Narrative    None     Social Determinants of Health     Financial Resource Strain:     Difficulty of Paying Living Expenses:    Food Insecurity:     Worried About Running Out of Food in the Last Year:     Ran Out of Food in the Last Year:    Transportation Needs:     Lack of Transportation (Medical):  Lack of Transportation (Non-Medical):    Physical Activity:     Days of Exercise per Week:     Minutes of Exercise per Session:    Stress:     Feeling of Stress :    Social Connections:     Frequency of Communication with Friends and Family:     Frequency of Social Gatherings with Friends and Family:     Attends Mosque Services:     Active Member of Clubs or Organizations:     Attends Club or Organization Meetings:     Marital Status:    Intimate Partner Violence:     Fear of Current or Ex-Partner:     Emotionally Abused:     Physically Abused:     Sexually Abused:        REVIEW OF SYSTEMS     Review of Systems   Genitourinary: Positive for vaginal bleeding. Except as noted above the remainder of the review of systems was reviewed and is negative.   SCREENINGS        Brielle Coma Scale  Eye Opening: Spontaneous  Best Verbal Response: Oriented  Best Motor Response: Obeys commands  Shelby Coma Scale Score: 15 PHYSICAL EXAM    (up to 7 for level 4, 8 or more for level 5)     ED Triage Vitals [08/03/21 1620]   BP Temp Temp Source Pulse Resp SpO2 Height Weight   111/72 97.6 °F (36.4 °C) Temporal 88 17 100 % 5' 3\" (1.6 m) 149 lb (67.6 kg)       Physical Exam  Constitutional:       General: She is not in acute distress. Appearance: Normal appearance. She is normal weight. She is not ill-appearing. HENT:      Head: Normocephalic and atraumatic. Right Ear: External ear normal.      Left Ear: External ear normal.      Nose: Nose normal. No congestion or rhinorrhea. Mouth/Throat:      Mouth: Mucous membranes are moist.      Pharynx: No oropharyngeal exudate or posterior oropharyngeal erythema. Eyes:      General:         Right eye: No discharge. Left eye: No discharge. Extraocular Movements: Extraocular movements intact. Pupils: Pupils are equal, round, and reactive to light. Cardiovascular:      Rate and Rhythm: Normal rate and regular rhythm. Pulses: Normal pulses. Pulmonary:      Effort: Pulmonary effort is normal.      Breath sounds: Normal breath sounds. Abdominal:      General: Abdomen is flat. Bowel sounds are normal. There is no distension. Tenderness: There is no abdominal tenderness. There is no right CVA tenderness, left CVA tenderness, guarding or rebound. Musculoskeletal:         General: No swelling or tenderness. Normal range of motion. Cervical back: Normal range of motion and neck supple. Right lower leg: No edema. Left lower leg: No edema. Skin:     General: Skin is warm and dry. Capillary Refill: Capillary refill takes less than 2 seconds. Neurological:      General: No focal deficit present. Mental Status: She is alert.    Psychiatric:         Mood and Affect: Mood normal.           DIAGNOSTIC RESULTS     EKG:(none if blank)  All EKGs are interpreted by the Emergency Department Physician who either signs or Co-signs this chart in the absence of a cardiologist.        RADIOLOGY: (none if blank)   I directly visualized the following images and reviewed the radiologist interpretations. Interpretation per the Radiologist below, if available at the time of this note:  No orders to display       LABS:  Labs Reviewed   URINE RT REFLEX TO CULTURE - Abnormal; Notable for the following components:       Result Value    Clarity, UA CLOUDY (*)     Leukocyte Esterase, Urine TRACE (*)     All other components within normal limits   MICROSCOPIC URINALYSIS   POC PREGNANCY UR-QUAL       All other labs were within normal range or not returned as of this dictation. Please note, any cultures that may have been sent were not resulted at the time of this patient visit. EMERGENCY DEPARTMENT COURSE and Medical Decision Making:     Vitals:    Vitals:    08/03/21 1620   BP: 111/72   Pulse: 88   Resp: 17   Temp: 97.6 °F (36.4 °C)   TempSrc: Temporal   SpO2: 100%   Weight: 149 lb (67.6 kg)   Height: 5' 3\" (1.6 m)       PROCEDURES: (None if blank)  Procedures       MDM     Patient is not having any symptoms at this time. She states that she only had it for 3 days in which it stopped since then. Her vitals are stable. She walked appropriately to the room with no issues. Urine was negative for pregnancy and infection. Patient appears well and nontoxic. No further blood work is necessary at this time. Offered the patient Covid test but states that \"I know I do not have that\". Patient will be discharged in stable condition. Strict return precautions and follow up instructions were discussed with the patient with which the patient agrees    ED Medications administered this visit:  Medications - No data to display      FINAL IMPRESSION      1.  Dysfunctional uterine bleeding          DISPOSITION/PLAN   DISPOSITION Decision To Discharge 08/03/2021 04:47:56 PM      PATIENT REFERRED TO:  Celso Ramirez MD  [facility] [address] [facility], [address] [place] [address]  [address]  [address] [facility]  [phone]            DISCHARGE MEDICATIONS:  New Prescriptions    No medications on file              Tate Strickland DO (electronically signed)  Attending Physician, Emergency Department         Tate Strickland DO  08/03/21 2126

## 2021-08-03 NOTE — ED NOTES
Discharge education reviewed verbally and in writing. Instructed to follow up with PCP and come back to the ED with any new or worsening symptoms. No questions or concerns at this time. No s/s of distress noted at this time.         Chico Lindo RN  08/03/21 7759

## 2021-08-03 NOTE — ED NOTES
Pt states that about a week and a half ago she had clots from vagina for 3 days. Pt states that 2 weeks prior she had a normal period for her. Pt states that she has had only cramping and no longer any bleeding. Pt states had spotting. Pt states that she had a headache and rates a 1/10. Pt states that she \"feels tired\".       Desire Galicia RN  08/03/21 1640

## 2021-10-05 ENCOUNTER — OFFICE VISIT (OUTPATIENT)
Dept: OBGYN CLINIC | Age: 26
End: 2021-10-05
Payer: COMMERCIAL

## 2021-10-05 VITALS
HEART RATE: 86 BPM | DIASTOLIC BLOOD PRESSURE: 68 MMHG | HEIGHT: 63 IN | SYSTOLIC BLOOD PRESSURE: 110 MMHG | BODY MASS INDEX: 27.29 KG/M2 | WEIGHT: 154 LBS

## 2021-10-05 PROCEDURE — 99203 OFFICE O/P NEW LOW 30 MIN: CPT | Performed by: OBSTETRICS & GYNECOLOGY

## 2021-10-05 PROCEDURE — 1036F TOBACCO NON-USER: CPT | Performed by: OBSTETRICS & GYNECOLOGY

## 2021-10-05 PROCEDURE — G8484 FLU IMMUNIZE NO ADMIN: HCPCS | Performed by: OBSTETRICS & GYNECOLOGY

## 2021-10-05 PROCEDURE — G8419 CALC BMI OUT NRM PARAM NOF/U: HCPCS | Performed by: OBSTETRICS & GYNECOLOGY

## 2021-10-05 PROCEDURE — G8427 DOCREV CUR MEDS BY ELIG CLIN: HCPCS | Performed by: OBSTETRICS & GYNECOLOGY

## 2021-10-05 ASSESSMENT — ENCOUNTER SYMPTOMS
APNEA: 0
ABDOMINAL PAIN: 0
SHORTNESS OF BREATH: 0

## 2021-10-05 NOTE — PROGRESS NOTES
Systems   Constitutional: Negative for fatigue and fever. Respiratory: Negative for apnea and shortness of breath. Cardiovascular: Negative for chest pain and palpitations. Gastrointestinal: Negative for abdominal pain. Genitourinary: Negative for difficulty urinating, dysuria, menstrual problem, pelvic pain, vaginal bleeding and vaginal discharge. Neurological: Negative for dizziness, weakness and light-headedness. Psychiatric/Behavioral: Negative for agitation and dysphoric mood. Objective:   /68   Pulse 86   Ht 5' 3\" (1.6 m)   Wt 154 lb (69.9 kg)   LMP 10/02/2021   BMI 27.28 kg/m²      Physical Exam  Constitutional:       Appearance: Normal appearance. Neurological:      Mental Status: She is alert and oriented to person, place, and time. Psychiatric:         Behavior: Behavior normal.         Assessment:       Diagnosis Orders   1. Endometriosis of pelvis           Plan:    in light of patients recent surgical evaluation recommend obtaining old records and then going from there. She was suggested to get ovulation kits and records and we could make recommendations based upon that finding  No orders of the defined types were placed in this encounter. No orders of the defined types were placed in this encounter. Return if symptoms worsen or fail to improve.      Ingrid Rice, DO

## 2023-03-29 ENCOUNTER — HOSPITAL ENCOUNTER (OUTPATIENT)
Dept: PULMONOLOGY | Age: 28
Discharge: HOME | End: 2023-03-29
Payer: COMMERCIAL

## 2023-03-29 DIAGNOSIS — R06.2 WHEEZING: ICD-10-CM

## 2023-03-29 PROCEDURE — 94729 DIFFUSING CAPACITY: CPT

## 2023-03-29 PROCEDURE — 94726 PLETHYSMOGRAPHY LUNG VOLUMES: CPT | Performed by: INTERNAL MEDICINE

## 2023-03-29 PROCEDURE — 94729 DIFFUSING CAPACITY: CPT | Performed by: INTERNAL MEDICINE

## 2023-03-29 PROCEDURE — 94060 EVALUATION OF WHEEZING: CPT | Performed by: INTERNAL MEDICINE

## 2023-03-29 PROCEDURE — 94726 PLETHYSMOGRAPHY LUNG VOLUMES: CPT

## 2023-03-29 PROCEDURE — 94060 EVALUATION OF WHEEZING: CPT

## 2023-03-29 PROCEDURE — 6360000002 HC RX W HCPCS

## 2023-03-29 RX ORDER — ALBUTEROL SULFATE 2.5 MG/3ML
SOLUTION RESPIRATORY (INHALATION)
Status: COMPLETED
Start: 2023-03-29 | End: 2023-03-29

## 2023-03-29 RX ADMIN — ALBUTEROL SULFATE 2.5 MG: 2.5 SOLUTION RESPIRATORY (INHALATION) at 10:00

## 2023-03-30 NOTE — PROCEDURES
[address]                      [address], [address] [facility]                    PULMONARY FUNCTION  Patel Jeffrey   33 y.o.   female  Height 63 in  Weight 170 lb      Referring provider   Isrrael Otoole NP    Reading provider   Jose Ramon Sarkar MD    Test meets ATS criteria for acceptability and reproducibility Yes    Diagnosis: PEREZ: Yes  Cough   Yes, wheezing Yes  Smoking   quit 1.6 years ago    Spirometry   FVC            3.01 L   82%  Post bronchodilator 3.06 L  83% Change 1 %  FEV1          2.50 L  80%   Post bronchodilator  2.72 L  87%   Change 8%  FEV1/FVC  82  %             Post bronchodilator  88 %  WGH73-98% 2.62 L  76%  Post bronchodilator  3.78 L  109%   Change 43%    Lung volume   SVC           2.95 L  80%   RV              1.77 L 134%   TLC            4.71  L 96%   RV/TLC      37 %    DLCO           79 %     Test interpretation     Spirometry is normal with no significant response to bronchodilator  Lung volumes shows air trapping  Diffusion capacity is normal  Patient has significant response to bronchodilator at mid airflow FEF 25 to 75% consider methacholine challenge test evaluate for asthma.        Clinical correlation is recommended     Jose Ramon Sarkar MD [facility], 3/30/2023 12:31 PM

## 2023-04-11 ENCOUNTER — PREP FOR PROCEDURE (OUTPATIENT)
Dept: OBGYN CLINIC | Age: 28
End: 2023-04-11

## 2023-04-11 PROBLEM — N80.9 ENDOMETRIOSIS, UNSPECIFIED: Status: ACTIVE | Noted: 2023-04-11

## 2023-04-13 ENCOUNTER — ANESTHESIA EVENT (OUTPATIENT)
Dept: OPERATING ROOM | Age: 28
End: 2023-04-13
Payer: COMMERCIAL

## 2023-04-13 RX ORDER — SODIUM CHLORIDE 9 MG/ML
INJECTION, SOLUTION INTRAVENOUS PRN
Status: CANCELLED | OUTPATIENT
Start: 2023-04-13

## 2023-04-13 RX ORDER — SODIUM CHLORIDE 0.9 % (FLUSH) 0.9 %
5-40 SYRINGE (ML) INJECTION EVERY 12 HOURS SCHEDULED
Status: CANCELLED | OUTPATIENT
Start: 2023-04-13

## 2023-04-13 RX ORDER — SODIUM CHLORIDE 0.9 % (FLUSH) 0.9 %
5-40 SYRINGE (ML) INJECTION PRN
Status: CANCELLED | OUTPATIENT
Start: 2023-04-13

## 2023-04-13 RX ORDER — SODIUM CHLORIDE, SODIUM LACTATE, POTASSIUM CHLORIDE, CALCIUM CHLORIDE 600; 310; 30; 20 MG/100ML; MG/100ML; MG/100ML; MG/100ML
INJECTION, SOLUTION INTRAVENOUS CONTINUOUS
Status: CANCELLED | OUTPATIENT
Start: 2023-04-13

## 2023-04-26 ENCOUNTER — HOSPITAL ENCOUNTER (OUTPATIENT)
Dept: PREADMISSION TESTING | Age: 28
Discharge: HOME | End: 2023-04-30
Payer: COMMERCIAL

## 2023-04-26 VITALS
TEMPERATURE: 97.4 F | HEIGHT: 63 IN | SYSTOLIC BLOOD PRESSURE: 114 MMHG | OXYGEN SATURATION: 99 % | WEIGHT: 176.4 LBS | BODY MASS INDEX: 31.25 KG/M2 | DIASTOLIC BLOOD PRESSURE: 65 MMHG | RESPIRATION RATE: 16 BRPM | HEART RATE: 73 BPM

## 2023-04-26 PROBLEM — K21.9 GASTROESOPHAGEAL REFLUX DISEASE: Status: ACTIVE | Noted: 2023-01-24

## 2023-04-26 LAB
ABO + RH BLD: NORMAL
ANION GAP SERPL CALCULATED.3IONS-SCNC: 13 MEQ/L (ref 9–15)
APTT PPP: 25.4 SEC (ref 24.4–36.8)
BILIRUB UR QL STRIP: NEGATIVE
BLD GP AB SCN SERPL QL: NORMAL
BUN SERPL-MCNC: 15 MG/DL (ref 6–20)
CALCIUM SERPL-MCNC: 9 MG/DL (ref 8.5–9.9)
CHLORIDE SERPL-SCNC: 102 MEQ/L (ref 95–107)
CLARITY UR: CLEAR
CO2 SERPL-SCNC: 25 MEQ/L (ref 20–31)
COLOR UR: YELLOW
CREAT SERPL-MCNC: 0.73 MG/DL (ref 0.5–0.9)
EKG ATRIAL RATE: 67 BPM
EKG P AXIS: 6 DEGREES
EKG P-R INTERVAL: 138 MS
EKG Q-T INTERVAL: 418 MS
EKG QRS DURATION: 98 MS
EKG QTC CALCULATION (BAZETT): 441 MS
EKG R AXIS: 65 DEGREES
EKG T AXIS: 52 DEGREES
EKG VENTRICULAR RATE: 67 BPM
ERYTHROCYTE [DISTWIDTH] IN BLOOD BY AUTOMATED COUNT: 13.5 % (ref 11.5–14.5)
GLUCOSE SERPL-MCNC: 78 MG/DL (ref 70–99)
GLUCOSE UR STRIP-MCNC: NEGATIVE MG/DL
HCT VFR BLD AUTO: 39.3 % (ref 37–47)
HGB BLD-MCNC: 13.2 G/DL (ref 12–16)
HGB UR QL STRIP: NEGATIVE
INR PPP: 1.1
KETONES UR STRIP-MCNC: NEGATIVE MG/DL
LEUKOCYTE ESTERASE UR QL STRIP: NEGATIVE
MCH RBC QN AUTO: 30.2 PG (ref 27–31.3)
MCHC RBC AUTO-ENTMCNC: 33.5 % (ref 33–37)
MCV RBC AUTO: 90.1 FL (ref 79.4–94.8)
NITRITE UR QL STRIP: NEGATIVE
PH UR STRIP: 5.5 [PH] (ref 5–9)
PLATELET # BLD AUTO: 325 K/UL (ref 130–400)
POTASSIUM SERPL-SCNC: 4.2 MEQ/L (ref 3.4–4.9)
PROT UR STRIP-MCNC: NEGATIVE MG/DL
PROTHROMBIN TIME: 14.5 SEC (ref 12.3–14.9)
RBC # BLD AUTO: 4.36 M/UL (ref 4.2–5.4)
SODIUM SERPL-SCNC: 140 MEQ/L (ref 135–144)
SP GR UR STRIP: 1.03 (ref 1–1.03)
URINE REFLEX TO CULTURE: NORMAL
UROBILINOGEN UR STRIP-ACNC: 0.2 E.U./DL
WBC # BLD AUTO: 10.1 K/UL (ref 4.8–10.8)

## 2023-04-26 PROCEDURE — 93005 ELECTROCARDIOGRAM TRACING: CPT

## 2023-04-26 PROCEDURE — 86850 RBC ANTIBODY SCREEN: CPT

## 2023-04-26 PROCEDURE — 85730 THROMBOPLASTIN TIME PARTIAL: CPT

## 2023-04-26 PROCEDURE — 93010 ELECTROCARDIOGRAM REPORT: CPT | Performed by: INTERNAL MEDICINE

## 2023-04-26 PROCEDURE — 80048 BASIC METABOLIC PNL TOTAL CA: CPT

## 2023-04-26 PROCEDURE — 86901 BLOOD TYPING SEROLOGIC RH(D): CPT

## 2023-04-26 PROCEDURE — 85027 COMPLETE CBC AUTOMATED: CPT

## 2023-04-26 PROCEDURE — 85610 PROTHROMBIN TIME: CPT

## 2023-04-26 PROCEDURE — 86900 BLOOD TYPING SEROLOGIC ABO: CPT

## 2023-04-26 PROCEDURE — 81003 URINALYSIS AUTO W/O SCOPE: CPT

## 2023-04-26 RX ORDER — PANTOPRAZOLE SODIUM 20 MG/1
20 TABLET, DELAYED RELEASE ORAL DAILY
Status: ON HOLD | COMMUNITY
End: 2023-05-01

## 2023-04-26 RX ORDER — MULTIVIT-MIN/IRON/FOLIC ACID/K 18-600-40
CAPSULE ORAL
Status: ON HOLD | COMMUNITY
End: 2023-05-01

## 2023-04-26 RX ORDER — CETIRIZINE HYDROCHLORIDE 10 MG/1
10 TABLET ORAL DAILY
COMMUNITY

## 2023-04-26 RX ORDER — MONTELUKAST SODIUM 10 MG/1
10 TABLET ORAL NIGHTLY
COMMUNITY

## 2023-04-26 RX ORDER — BUDESONIDE AND FORMOTEROL FUMARATE DIHYDRATE 160; 4.5 UG/1; UG/1
2 AEROSOL RESPIRATORY (INHALATION) 2 TIMES DAILY
COMMUNITY

## 2023-04-26 ASSESSMENT — ENCOUNTER SYMPTOMS
ABDOMINAL PAIN: 0
EYE PAIN: 0
SORE THROAT: 0
BLOOD IN STOOL: 0
SINUS PRESSURE: 0
TROUBLE SWALLOWING: 0
ALLERGIC/IMMUNOLOGIC NEGATIVE: 1
BACK PAIN: 0
EYE ITCHING: 0
RHINORRHEA: 0
FACIAL SWELLING: 0
SHORTNESS OF BREATH: 0
EYE REDNESS: 0
ABDOMINAL DISTENTION: 0
PHOTOPHOBIA: 0
DIARRHEA: 0
VOMITING: 0
CONSTIPATION: 0
NAUSEA: 0
EYE DISCHARGE: 0
WHEEZING: 0
CHEST TIGHTNESS: 0
COUGH: 0
SINUS PAIN: 0

## 2023-05-01 ENCOUNTER — ANESTHESIA (OUTPATIENT)
Dept: OPERATING ROOM | Age: 28
End: 2023-05-01
Payer: COMMERCIAL

## 2023-05-01 ENCOUNTER — HOSPITAL ENCOUNTER (OUTPATIENT)
Age: 28
Setting detail: OUTPATIENT SURGERY
Discharge: HOME | End: 2023-05-01
Attending: OBSTETRICS & GYNECOLOGY | Admitting: OBSTETRICS & GYNECOLOGY
Payer: COMMERCIAL

## 2023-05-01 VITALS
HEART RATE: 71 BPM | HEIGHT: 63 IN | TEMPERATURE: 97.4 F | BODY MASS INDEX: 31.18 KG/M2 | SYSTOLIC BLOOD PRESSURE: 104 MMHG | OXYGEN SATURATION: 94 % | DIASTOLIC BLOOD PRESSURE: 65 MMHG | RESPIRATION RATE: 12 BRPM | WEIGHT: 176 LBS

## 2023-05-01 DIAGNOSIS — N80.9 ENDOMETRIOSIS, UNSPECIFIED: ICD-10-CM

## 2023-05-01 DIAGNOSIS — G89.18 POSTOPERATIVE PAIN: Primary | ICD-10-CM

## 2023-05-01 LAB
BILIRUB UR QL STRIP: NEGATIVE
CLARITY UR: ABNORMAL
COLOR UR: YELLOW
GLUCOSE UR STRIP-MCNC: NEGATIVE MG/DL
HCG, URINE, POC: NEGATIVE
HGB UR QL STRIP: NEGATIVE
KETONES UR STRIP-MCNC: NEGATIVE MG/DL
LEUKOCYTE ESTERASE UR QL STRIP: NEGATIVE
Lab: NORMAL
NEGATIVE QC PASS/FAIL: NORMAL
NITRITE UR QL STRIP: NEGATIVE
PH UR STRIP: 6 [PH] (ref 5–9)
POSITIVE QC PASS/FAIL: NORMAL
PROT UR STRIP-MCNC: NEGATIVE MG/DL
SP GR UR STRIP: 1.02 (ref 1–1.03)
UROBILINOGEN UR STRIP-ACNC: 0.2 E.U./DL

## 2023-05-01 PROCEDURE — 6360000002 HC RX W HCPCS: Performed by: OBSTETRICS & GYNECOLOGY

## 2023-05-01 PROCEDURE — 6370000000 HC RX 637 (ALT 250 FOR IP): Performed by: OBSTETRICS & GYNECOLOGY

## 2023-05-01 PROCEDURE — 3600000014 HC SURGERY LEVEL 4 ADDTL 15MIN: Performed by: OBSTETRICS & GYNECOLOGY

## 2023-05-01 PROCEDURE — 3600000004 HC SURGERY LEVEL 4 BASE: Performed by: OBSTETRICS & GYNECOLOGY

## 2023-05-01 PROCEDURE — 7100000000 HC PACU RECOVERY - FIRST 15 MIN: Performed by: OBSTETRICS & GYNECOLOGY

## 2023-05-01 PROCEDURE — 81003 URINALYSIS AUTO W/O SCOPE: CPT

## 2023-05-01 PROCEDURE — 64488 TAP BLOCK BI INJECTION: CPT | Performed by: ANESTHESIOLOGY

## 2023-05-01 PROCEDURE — 2580000003 HC RX 258: Performed by: ANESTHESIOLOGY

## 2023-05-01 PROCEDURE — 7100000010 HC PHASE II RECOVERY - FIRST 15 MIN: Performed by: OBSTETRICS & GYNECOLOGY

## 2023-05-01 PROCEDURE — 58558 HYSTEROSCOPY BIOPSY: CPT | Performed by: OBSTETRICS & GYNECOLOGY

## 2023-05-01 PROCEDURE — 7100000011 HC PHASE II RECOVERY - ADDTL 15 MIN: Performed by: OBSTETRICS & GYNECOLOGY

## 2023-05-01 PROCEDURE — 3700000001 HC ADD 15 MINUTES (ANESTHESIA): Performed by: OBSTETRICS & GYNECOLOGY

## 2023-05-01 PROCEDURE — 88305 TISSUE EXAM BY PATHOLOGIST: CPT

## 2023-05-01 PROCEDURE — 3700000000 HC ANESTHESIA ATTENDED CARE: Performed by: OBSTETRICS & GYNECOLOGY

## 2023-05-01 PROCEDURE — A4217 STERILE WATER/SALINE, 500 ML: HCPCS | Performed by: OBSTETRICS & GYNECOLOGY

## 2023-05-01 PROCEDURE — 6370000000 HC RX 637 (ALT 250 FOR IP): Performed by: ANESTHESIOLOGY

## 2023-05-01 PROCEDURE — 6360000002 HC RX W HCPCS: Performed by: ANESTHESIOLOGY

## 2023-05-01 PROCEDURE — 2580000003 HC RX 258: Performed by: OBSTETRICS & GYNECOLOGY

## 2023-05-01 PROCEDURE — 7100000001 HC PACU RECOVERY - ADDTL 15 MIN: Performed by: OBSTETRICS & GYNECOLOGY

## 2023-05-01 PROCEDURE — 2709999900 HC NON-CHARGEABLE SUPPLY: Performed by: OBSTETRICS & GYNECOLOGY

## 2023-05-01 PROCEDURE — 49320 DIAG LAPARO SEPARATE PROC: CPT | Performed by: OBSTETRICS & GYNECOLOGY

## 2023-05-01 PROCEDURE — 58350 REOPEN FALLOPIAN TUBE: CPT | Performed by: OBSTETRICS & GYNECOLOGY

## 2023-05-01 PROCEDURE — 2500000003 HC RX 250 WO HCPCS: Performed by: ANESTHESIOLOGY

## 2023-05-01 RX ORDER — SODIUM CHLORIDE 0.9 % (FLUSH) 0.9 %
5-40 SYRINGE (ML) INJECTION PRN
Status: DISCONTINUED | OUTPATIENT
Start: 2023-05-01 | End: 2023-05-01 | Stop reason: HOSPADM

## 2023-05-01 RX ORDER — SODIUM CHLORIDE 9 MG/ML
INJECTION, SOLUTION INTRAVENOUS PRN
Status: DISCONTINUED | OUTPATIENT
Start: 2023-05-01 | End: 2023-05-01 | Stop reason: HOSPADM

## 2023-05-01 RX ORDER — IBUPROFEN 600 MG/1
600 TABLET ORAL EVERY 8 HOURS SCHEDULED
Status: DISCONTINUED | OUTPATIENT
Start: 2023-05-01 | End: 2023-05-01 | Stop reason: HOSPADM

## 2023-05-01 RX ORDER — ONDANSETRON 4 MG/1
4 TABLET, ORALLY DISINTEGRATING ORAL EVERY 8 HOURS PRN
Status: DISCONTINUED | OUTPATIENT
Start: 2023-05-01 | End: 2023-05-01 | Stop reason: HOSPADM

## 2023-05-01 RX ORDER — SODIUM CHLORIDE, SODIUM LACTATE, POTASSIUM CHLORIDE, CALCIUM CHLORIDE 600; 310; 30; 20 MG/100ML; MG/100ML; MG/100ML; MG/100ML
INJECTION, SOLUTION INTRAVENOUS CONTINUOUS PRN
Status: DISCONTINUED | OUTPATIENT
Start: 2023-05-01 | End: 2023-05-01 | Stop reason: HOSPADM

## 2023-05-01 RX ORDER — CEFAZOLIN SODIUM IN 0.9 % NACL 2 G/100 ML
2000 PLASTIC BAG, INJECTION (ML) INTRAVENOUS
Status: COMPLETED | OUTPATIENT
Start: 2023-05-01 | End: 2023-05-01

## 2023-05-01 RX ORDER — SODIUM CHLORIDE, SODIUM LACTATE, POTASSIUM CHLORIDE, CALCIUM CHLORIDE 600; 310; 30; 20 MG/100ML; MG/100ML; MG/100ML; MG/100ML
INJECTION, SOLUTION INTRAVENOUS CONTINUOUS
Status: DISCONTINUED | OUTPATIENT
Start: 2023-05-01 | End: 2023-05-01 | Stop reason: HOSPADM

## 2023-05-01 RX ORDER — ONDANSETRON 2 MG/ML
4 INJECTION INTRAMUSCULAR; INTRAVENOUS
Status: DISCONTINUED | OUTPATIENT
Start: 2023-05-01 | End: 2023-05-01 | Stop reason: HOSPADM

## 2023-05-01 RX ORDER — OXYCODONE HYDROCHLORIDE 5 MG/1
10 TABLET ORAL PRN
Status: COMPLETED | OUTPATIENT
Start: 2023-05-01 | End: 2023-05-01

## 2023-05-01 RX ORDER — LIDOCAINE HYDROCHLORIDE 20 MG/ML
JELLY TOPICAL PRN
Status: DISCONTINUED | OUTPATIENT
Start: 2023-05-01 | End: 2023-05-01 | Stop reason: HOSPADM

## 2023-05-01 RX ORDER — MIDAZOLAM HYDROCHLORIDE 1 MG/ML
INJECTION INTRAMUSCULAR; INTRAVENOUS PRN
Status: DISCONTINUED | OUTPATIENT
Start: 2023-05-01 | End: 2023-05-01 | Stop reason: SDUPTHER

## 2023-05-01 RX ORDER — ROCURONIUM BROMIDE 10 MG/ML
INJECTION, SOLUTION INTRAVENOUS PRN
Status: DISCONTINUED | OUTPATIENT
Start: 2023-05-01 | End: 2023-05-01 | Stop reason: SDUPTHER

## 2023-05-01 RX ORDER — DEXAMETHASONE SODIUM PHOSPHATE 10 MG/ML
INJECTION INTRAMUSCULAR; INTRAVENOUS PRN
Status: DISCONTINUED | OUTPATIENT
Start: 2023-05-01 | End: 2023-05-01 | Stop reason: SDUPTHER

## 2023-05-01 RX ORDER — ONDANSETRON 2 MG/ML
INJECTION INTRAMUSCULAR; INTRAVENOUS PRN
Status: DISCONTINUED | OUTPATIENT
Start: 2023-05-01 | End: 2023-05-01 | Stop reason: SDUPTHER

## 2023-05-01 RX ORDER — SODIUM CHLORIDE 9 MG/ML
INJECTION, SOLUTION INTRAVENOUS CONTINUOUS PRN
Status: DISCONTINUED | OUTPATIENT
Start: 2023-05-01 | End: 2023-05-01 | Stop reason: SDUPTHER

## 2023-05-01 RX ORDER — LIDOCAINE HYDROCHLORIDE 20 MG/ML
INJECTION, SOLUTION INTRAVENOUS PRN
Status: DISCONTINUED | OUTPATIENT
Start: 2023-05-01 | End: 2023-05-01 | Stop reason: SDUPTHER

## 2023-05-01 RX ORDER — PROPOFOL 10 MG/ML
INJECTION, EMULSION INTRAVENOUS PRN
Status: DISCONTINUED | OUTPATIENT
Start: 2023-05-01 | End: 2023-05-01 | Stop reason: SDUPTHER

## 2023-05-01 RX ORDER — SODIUM CHLORIDE 0.9 % (FLUSH) 0.9 %
5-40 SYRINGE (ML) INJECTION EVERY 12 HOURS SCHEDULED
Status: DISCONTINUED | OUTPATIENT
Start: 2023-05-01 | End: 2023-05-01 | Stop reason: HOSPADM

## 2023-05-01 RX ORDER — METHYLENE BLUE 10 MG/ML
INJECTION INTRAVENOUS PRN
Status: DISCONTINUED | OUTPATIENT
Start: 2023-05-01 | End: 2023-05-01 | Stop reason: HOSPADM

## 2023-05-01 RX ORDER — FENTANYL CITRATE 0.05 MG/ML
50 INJECTION, SOLUTION INTRAMUSCULAR; INTRAVENOUS EVERY 5 MIN PRN
Status: DISCONTINUED | OUTPATIENT
Start: 2023-05-01 | End: 2023-05-01 | Stop reason: HOSPADM

## 2023-05-01 RX ORDER — TRAMADOL HYDROCHLORIDE 50 MG/1
50 TABLET ORAL EVERY 6 HOURS PRN
Qty: 12 TABLET | Refills: 0 | Status: SHIPPED | OUTPATIENT
Start: 2023-05-01 | End: 2023-05-04

## 2023-05-01 RX ORDER — OXYCODONE HYDROCHLORIDE 5 MG/1
5 TABLET ORAL PRN
Status: COMPLETED | OUTPATIENT
Start: 2023-05-01 | End: 2023-05-01

## 2023-05-01 RX ORDER — OXYCODONE HYDROCHLORIDE 5 MG/1
5 TABLET ORAL EVERY 4 HOURS PRN
Status: DISCONTINUED | OUTPATIENT
Start: 2023-05-01 | End: 2023-05-01 | Stop reason: HOSPADM

## 2023-05-01 RX ORDER — ONDANSETRON 2 MG/ML
4 INJECTION INTRAMUSCULAR; INTRAVENOUS EVERY 6 HOURS PRN
Status: DISCONTINUED | OUTPATIENT
Start: 2023-05-01 | End: 2023-05-01 | Stop reason: HOSPADM

## 2023-05-01 RX ORDER — ROPIVACAINE HYDROCHLORIDE 5 MG/ML
INJECTION, SOLUTION EPIDURAL; INFILTRATION; PERINEURAL PRN
Status: DISCONTINUED | OUTPATIENT
Start: 2023-05-01 | End: 2023-05-01 | Stop reason: SDUPTHER

## 2023-05-01 RX ORDER — MAGNESIUM HYDROXIDE 1200 MG/15ML
LIQUID ORAL CONTINUOUS PRN
Status: DISCONTINUED | OUTPATIENT
Start: 2023-05-01 | End: 2023-05-01 | Stop reason: HOSPADM

## 2023-05-01 RX ORDER — FENTANYL CITRATE 0.05 MG/ML
25 INJECTION, SOLUTION INTRAMUSCULAR; INTRAVENOUS EVERY 5 MIN PRN
Status: DISCONTINUED | OUTPATIENT
Start: 2023-05-01 | End: 2023-05-01 | Stop reason: HOSPADM

## 2023-05-01 RX ADMIN — LIDOCAINE HYDROCHLORIDE 60 MG: 20 INJECTION, SOLUTION INTRAVENOUS at 10:30

## 2023-05-01 RX ADMIN — OXYCODONE 5 MG: 5 TABLET ORAL at 12:34

## 2023-05-01 RX ADMIN — ONDANSETRON 4 MG: 2 INJECTION INTRAMUSCULAR; INTRAVENOUS at 11:18

## 2023-05-01 RX ADMIN — SODIUM CHLORIDE: 9 INJECTION, SOLUTION INTRAVENOUS at 09:59

## 2023-05-01 RX ADMIN — ROCURONIUM BROMIDE 50 MG: 10 INJECTION INTRAVENOUS at 10:30

## 2023-05-01 RX ADMIN — SODIUM CHLORIDE: 9 INJECTION, SOLUTION INTRAVENOUS at 10:25

## 2023-05-01 RX ADMIN — SUGAMMADEX 200 MG: 100 INJECTION, SOLUTION INTRAVENOUS at 11:24

## 2023-05-01 RX ADMIN — DEXAMETHASONE SODIUM PHOSPHATE 8 MG: 10 INJECTION INTRAMUSCULAR; INTRAVENOUS at 11:11

## 2023-05-01 RX ADMIN — ROPIVACAINE HYDROCHLORIDE 30 ML: 5 INJECTION, SOLUTION EPIDURAL; INFILTRATION; PERINEURAL at 10:36

## 2023-05-01 RX ADMIN — HYDROMORPHONE HYDROCHLORIDE 0.5 MG: 1 INJECTION, SOLUTION INTRAMUSCULAR; INTRAVENOUS; SUBCUTANEOUS at 12:04

## 2023-05-01 RX ADMIN — MIDAZOLAM HYDROCHLORIDE 2 MG: 1 INJECTION, SOLUTION INTRAMUSCULAR; INTRAVENOUS at 10:24

## 2023-05-01 RX ADMIN — PROPOFOL 200 MG: 10 INJECTION, EMULSION INTRAVENOUS at 10:30

## 2023-05-01 RX ADMIN — Medication 2000 MG: at 10:38

## 2023-05-01 ASSESSMENT — PAIN SCALES - GENERAL
PAINLEVEL_OUTOF10: 6
PAINLEVEL_OUTOF10: 10
PAINLEVEL_OUTOF10: 4
PAINLEVEL_OUTOF10: 7
PAINLEVEL_OUTOF10: 9
PAINLEVEL_OUTOF10: 0

## 2023-05-01 ASSESSMENT — PAIN DESCRIPTION - DESCRIPTORS: DESCRIPTORS: ACHING;CRAMPING

## 2023-05-01 ASSESSMENT — PAIN DESCRIPTION - LOCATION: LOCATION: ABDOMEN

## 2023-05-01 NOTE — ANESTHESIA PRE PROCEDURE
Department of Anesthesiology  Preprocedure Note       Name:  Laci Rodrigues   Age:  33 y.o.  :  1995                                          MRN:  [id number]         Date:  2023      Surgeon: Geovany Schuler):  Kristofer Jason DO    Procedure: Procedure(s): HYSTEROSCOPY, DILATION & CURETTAGE, OPERATIVE LAPAROSCOPY, TUBAL LAVAGE    Medications prior to admission:   Prior to Admission medications    Medication Sig Start Date End Date Taking?  Authorizing Provider   budesonide-formoterol (SYMBICORT) 160-4.5 MCG/ACT AERO Inhale 2 puffs into the lungs 2 times daily    Historical Provider, MD   Cholecalciferol (VITAMIN D) 50 MCG (2000) CAPS capsule Take by mouth    Historical Provider, MD   pantoprazole (PROTONIX) 20 MG tablet Take 1 tablet by mouth daily    Historical Provider, MD   montelukast (SINGULAIR) 10 MG tablet Take 1 tablet by mouth nightly    Historical Provider, MD   cetirizine (ZYRTEC) 10 MG tablet Take 1 tablet by mouth daily    Historical Provider, MD   fluconazole (DIFLUCAN) 200 MG tablet Take 1 tablet by mouth daily In 3 days for continued symtoms  Patient not taking: Reported on 10/5/2021 3/23/21   Constance Dawson, APRN - CNP   butalbital-acetaminophen-caffeine (FIORICET, ESGIC) -40 MG per tablet TK ONE T PO BID PRN  Patient not taking: No sig reported 18   Historical Provider, MD   medroxyPROGESTERone (DEPO-PROVERA) 400 MG/ML SUSP Inject 400 mg into the muscle  Patient not taking: Reported on 10/5/2021 12/5/15   Historical Provider, MD   fluticasone (FLONASE) 50 MCG/ACT nasal spray 1 spray by Nasal route 2 times daily 10/30/18   JES Mcmahon CNP   tiZANidine (ZANAFLEX) 4 MG tablet TAKE 1 TABLET BY MOUTH EVERY NIGHT AS NEEDED FOR SPASMS  Patient not taking: Reported on 10/5/2021 10/2/18   JES Del Angel CNP       Current medications:    Current Facility-Administered Medications   Medication Dose Route Frequency Provider Last Rate Last Admin    lactated

## 2023-05-01 NOTE — ANESTHESIA POSTPROCEDURE EVALUATION
Department of Anesthesiology  Postprocedure Note    Patient: Mami Duran  MRN: [id number]  YOB: 1995  Date of evaluation: 5/1/2023      Procedure Summary     Date: 05/01/23 Room / Location: [facility] OR 03 / [facility]    Anesthesia Start: 1025 Anesthesia Stop: 1137    Procedure: HYSTEROSCOPY, [address], OPERATIVE LAPAROSCOPY, TUBAL LAVAGE (Abdomen/Perineum) Diagnosis:       Endometriosis, unspecified      (Endometriosis, unspecified [N80.9])    Surgeons: Kaylie Jean DO Responsible Provider: Grandville Cranker, MD    Anesthesia Type: general, regional ASA Status: 2          Anesthesia Type: No value filed.     Evelin Phase I: Evelin Score: 10    Evelin Phase II:        Anesthesia Post Evaluation    Patient location during evaluation: PACU  Patient participation: complete - patient participated  Level of consciousness: awake and alert  Pain score: 2  Airway patency: patent  Nausea & Vomiting: no vomiting and no nausea  Complications: no  Cardiovascular status: hemodynamically stable  Respiratory status: face mask  Hydration status: stable

## 2023-05-01 NOTE — FLOWSHEET NOTE
1230- Discharge instructions gone over with patient and patients significant other and mother, no further questions at this time-KGW  1250- Pt aware of not taking more pain medication until after 6:35pm this evening-KGW  Electronically signed by Kingsley Cross RN on 5/1/2023 at 12:53 PM

## 2023-05-01 NOTE — INTERVAL H&P NOTE
Update History & Physical    The patient's History and Physical of April 26, 2023 was reviewed with the patient and I examined the patient. There was no change. The surgical site was confirmed by the patient and me. Plan: The risks, benefits, expected outcome, and alternative to the recommended procedure have been discussed with the patient. Patient understands and wants to proceed with the procedure.      Electronically signed by Brett Sanford DO on 5/1/2023 at 10:30 AM

## 2023-05-01 NOTE — ANESTHESIA PROCEDURE NOTES
Peripheral Block    Patient location during procedure: pre-op  Reason for block: post-op pain management and at surgeon's request  Start time: 5/1/2023 10:33 AM  End time: 5/1/2023 10:38 AM  Staffing  Performed: anesthesiologist   Anesthesiologist: Bunny Jain MD  Preanesthetic Checklist  Completed: patient identified, IV checked, site marked, risks and benefits discussed, surgical/procedural consents, equipment checked, pre-op evaluation, timeout performed, anesthesia consent given, oxygen available and monitors applied/VS acknowledged  Peripheral Block   Patient position: supine  Prep: ChloraPrep  Provider prep: mask and sterile gloves (Sterile probe cover)  Patient monitoring: cardiac monitor, continuous pulse ox, frequent blood pressure checks and IV access  Block type: TAP  Laterality: bilateral  Injection technique: single-shot  Guidance: ultrasound guided  Local infiltration: ropivacaine (20 cc of NS added)  Infiltration strength: 0.5 %  Local infiltration: ropivacaine (20 cc of NS added)  Dose: 30 mL    Needle   Needle type: combined needle/nerve stimulator   Needle gauge: 22 G  Needle localization: anatomical landmarks and ultrasound guidance  Needle length: 5 cm  Assessment   Injection assessment: negative aspiration for heme, no paresthesia on injection and local visualized surrounding nerve on ultrasound  Paresthesia pain: immediately resolved  Slow fractionated injection: yes  Hemodynamics: stable  Real-time US image taken/store: yes    Additional Notes  Ultrasound image printed and saved in patient chart.     Sterile probe cover used

## 2023-05-01 NOTE — DISCHARGE INSTRUCTIONS
Short Stay Surgery Gynecological Discharge Instructions    Activty  [x]    Because of the medication you have received, you should not drive, operative machinery, drink alcohol or make any important decisions for 24 hours  [x]    You may climb stairs, but take them slowly  []    No heavy lifting for 2   Days[x]    Weeks  [x]    Refrain from smoking  []    No sexual activity for 1 weeks    Diet:  [x]    Diet as tolerated - start with soft foods and gradually increase to your normal diet  []    Do not drink alcohol while taking pain medications    Shower Care  [x]    Shower or sponge bathe daily  []    No tub baths, hot tubs or swimming until your doctor gives permission    Incisional Care  [x]    May remove band-aids or dressings on your abdomen in 24 hours   []    Apply new dressing/band-aids daily   [x]    Leave areas open to the air  []    Do not remove any steri-strips (paper tapes). They will curl up and fall off in 5 to 7 days  [x]    Your incision(s) have been closed with a special surgical glue which will gradually wear away as your incision(s) heal  []    Change your pad/pantyliner when soiled.  No tampon use at this time  []    Expect a yellow or blood tinged vaginal drainage for 2-3 weeks    Call our physician if:  []  There is persistent bleeding from your incision or you saturate a maxi-pad with bright red drainage in an hour    []    There is redness, increased swelling, drainage that is yellow or green in color or has a foul odor or our incisional site would begin to open  []    You would develop a temperature of 101 degrees Fahrenheit or above  []    Ou continue to experience severe pain unrelieved by pain medications

## 2023-05-01 NOTE — OP NOTE
PATIENT NAME: Lea Herbert  MEDICAL RECORD NO. [id number]  SURGEON: Thomas Cuevas  Primary Care Physician: Abhijeet Novak     PROCEDURE PERFORMED:  5/1/2023  PREOPERATIVE DIAGNOSIS: Pelvic pain. History of endometriosis. Secondary infertility  POSTOPERATIVE DIAGNOSIS: Same no evidence of endometriosis. Normal intrauterine cavity. Normal pelvis. Bilateral tubal patency  PROCEDURE PERFORMED: Hysteroscopy D&C operative laparoscopy tubal lavage  SURGEON:  Dr. Nanda Albright:  GET  ESTIMATED BLOOD LOSS: Minimal ml  SPECIMEN: Endometrium  COMPLICATIONS:  None immediately appreciated. DISCUSSION: Brendan Dillard is a 33y.o. year old female who presents with the above complaints. She is here for laparoscopy and hysteroscopy. After history and physical examination was performed, potential diagnostic and therapeutic modalities discussed with the patient. She was given opportunity to ask questions. Once answered informed consent was obtained. She was brought to operating room on 5/1/2023 for procedure. PROCEDURE: Patient was taken to the operating room and general anesthetic was administered she is placed in the dorsal thigh position prepped and draped in usual fashion bladder drained timeout was called. Exam under anesthesia reveals a small anteverted mobile uterus the adnexa is negative for mass. The cervix was exposed the anterior anterior lip was grasped with a tenaculum the endocervical canal was progressively dilated uterus sounded to 7 cm hysteroscope was inserted. No abnormalities were identified. Bilateral tubal ostia are visualized. Hysteroscope was removed. D&C is performed with scant endometrium removed and sent for pathologic evaluation uterine manipulator was inserted.   Small infraumbilical incision is made the lower abdomen is elevated with a 5 mm laparoscope through the end of a 5 mm blunt trocar direct visualization entry is accomplished while aiming toward the hollow of the

## 2024-02-13 ENCOUNTER — OFFICE VISIT (OUTPATIENT)
Dept: PULMONOLOGY | Age: 29
End: 2024-02-13
Payer: COMMERCIAL

## 2024-02-13 VITALS
SYSTOLIC BLOOD PRESSURE: 112 MMHG | TEMPERATURE: 97.6 F | WEIGHT: 176 LBS | HEART RATE: 68 BPM | OXYGEN SATURATION: 99 % | DIASTOLIC BLOOD PRESSURE: 68 MMHG | HEIGHT: 63 IN | BODY MASS INDEX: 31.18 KG/M2

## 2024-02-13 DIAGNOSIS — J45.909 UNCOMPLICATED ASTHMA, UNSPECIFIED ASTHMA SEVERITY, UNSPECIFIED WHETHER PERSISTENT: ICD-10-CM

## 2024-02-13 DIAGNOSIS — R05.9 COUGH, UNSPECIFIED TYPE: ICD-10-CM

## 2024-02-13 DIAGNOSIS — G47.30 SLEEP DISORDER BREATHING: ICD-10-CM

## 2024-02-13 DIAGNOSIS — E66.9 OBESITY, UNSPECIFIED CLASSIFICATION, UNSPECIFIED OBESITY TYPE, UNSPECIFIED WHETHER SERIOUS COMORBIDITY PRESENT: ICD-10-CM

## 2024-02-13 DIAGNOSIS — R06.02 SHORTNESS OF BREATH: Primary | ICD-10-CM

## 2024-02-13 PROCEDURE — 1036F TOBACCO NON-USER: CPT | Performed by: INTERNAL MEDICINE

## 2024-02-13 PROCEDURE — 99204 OFFICE O/P NEW MOD 45 MIN: CPT | Performed by: INTERNAL MEDICINE

## 2024-02-13 PROCEDURE — G8427 DOCREV CUR MEDS BY ELIG CLIN: HCPCS | Performed by: INTERNAL MEDICINE

## 2024-02-13 PROCEDURE — G8484 FLU IMMUNIZE NO ADMIN: HCPCS | Performed by: INTERNAL MEDICINE

## 2024-02-13 PROCEDURE — G8417 CALC BMI ABV UP PARAM F/U: HCPCS | Performed by: INTERNAL MEDICINE

## 2024-02-13 NOTE — PROGRESS NOTES
NEW PATIENT VISIT-PULMONARY/SLEEP    2/13/2024     REFERRING PHYSICIAN:  Katarina Springer     REASON FOR REFERRAL:  Asthma    HPI:     Ga Luz is a 28 y.o. female who was referred to pulmonary clinic for evaluation.     She was diagnosed with asthma last year due to shortness of breath, chest tightness and wheezing.  She had a PFT last year which showed normal spirometry but had good response in mid flows.  Has been started on a rescue inhaler and maintenance inhaler but was not able to afford maintenance inhaler.  Had recurrent symptoms of shortness of breath, chest tightness and wheezing.  Symptoms are worse at night.  Has been using nebulizers and rescue inhalers more frequently at night.  Was started recently on Singulair.  Snores but unclear about witnessed apneas.  She wakes up sometimes gasping for air.  She is tired and sleepy during the day and she dozes off easily.         Past Medical History   Past Medical History:   Diagnosis Date    Asthma     Endometriosis        Past Surgical History  Past Surgical History:   Procedure Laterality Date    DILATION AND CURETTAGE OF UTERUS N/A 5/1/2023    HYSTEROSCOPY, DILATION & CURETTAGE, OPERATIVE LAPAROSCOPY, TUBAL LAVAGE performed by Grover Doshi,  at [facility] OR    LAPAROSCOPY      TONSILLECTOMY      WISDOM TOOTH EXTRACTION         Allergies  Allergies   Allergen Reactions    Acetaminophen-Pamabrom Itching    Midol [Acetaminophen]     Pamabrom        Medications  Current Outpatient Medications   Medication Sig Dispense Refill    budesonide-formoterol (SYMBICORT) 160-4.5 MCG/ACT AERO Inhale 2 puffs into the lungs 2 times daily      montelukast (SINGULAIR) 10 MG tablet Take 1 tablet by mouth nightly      cetirizine (ZYRTEC) 10 MG tablet Take 1 tablet by mouth daily      fluticasone (FLONASE) 50 MCG/ACT nasal spray 1 spray by Nasal route 2 times daily 1 Bottle 1     No current facility-administered medications for this visit.       Social

## 2024-03-06 ENCOUNTER — HOSPITAL ENCOUNTER (OUTPATIENT)
Dept: SLEEP CENTER | Age: 29
Discharge: HOME | End: 2024-03-08
Payer: COMMERCIAL

## 2024-03-06 PROCEDURE — 95806 SLEEP STUDY UNATT&RESP EFFT: CPT

## 2024-04-02 ENCOUNTER — TELEMEDICINE (OUTPATIENT)
Dept: PULMONOLOGY | Age: 29
End: 2024-04-02
Payer: COMMERCIAL

## 2024-04-02 DIAGNOSIS — J45.909 UNCOMPLICATED ASTHMA, UNSPECIFIED ASTHMA SEVERITY, UNSPECIFIED WHETHER PERSISTENT: Primary | ICD-10-CM

## 2024-04-02 DIAGNOSIS — G47.30 SLEEP DISORDER BREATHING: ICD-10-CM

## 2024-04-02 PROCEDURE — 99423 OL DIG E/M SVC 21+ MIN: CPT | Performed by: INTERNAL MEDICINE

## 2024-04-02 RX ORDER — ALBUTEROL SULFATE 2.5 MG/3ML
2.5 SOLUTION RESPIRATORY (INHALATION) 4 TIMES DAILY PRN
Qty: 120 EACH | Refills: 3 | Status: SHIPPED | OUTPATIENT
Start: 2024-04-02

## 2024-04-02 NOTE — PROGRESS NOTES
Ga Luz, was evaluated through a synchronous (real-time) audio-video encounter. The patient (or guardian if applicable) is aware that this is a billable service, which includes applicable co-pays. This Virtual Visit was conducted with patient's (and/or legal guardian's) consent. Patient identification was verified, and a caregiver was present when appropriate.   The patient was located at Home: [address] [place] [id number]  Provider was located at Facility (Appt Dept): [address] [address]  Confirm you are appropriately licensed, registered, or certified to deliver care in the state where the patient is located as indicated above. If you are not or unsure, please re-schedule the visit: Yes, I confirm.     Ga Luz (:  1995) is a Established patient, presenting virtually for evaluation of the following:    Assessment & Plan   Below is the assessment and plan developed based on review of pertinent history, physical exam, labs, studies, and medications.      1. Uncomplicated asthma, unspecified asthma severity, unspecified whether persistent  2. Sleep disorder breathing  -     Home Sleep Study      Recommendations:    - I discussed the results of her sleep study in details and answered all her questions.  Overall normal study.  -Asthma has been acting up a bit.  Continue Symbicort.  I will prescribe nebulizer solution for her.  -She is to call me if worsening respiratory status.      No follow-ups on file.       Subjective  Results.    HPI      24:    She was diagnosed with asthma last year due to shortness of breath, chest tightness and wheezing.  She had a PFT last year which showed normal spirometry but had good response in mid flows.  Has been started on a rescue inhaler and maintenance inhaler but was not able to afford maintenance inhaler.  Had recurrent symptoms of shortness of breath, chest tightness and wheezing.  Symptoms are worse at night.  Has been

## 2024-05-20 PROBLEM — G47.30 SLEEP DISORDER BREATHING: Status: ACTIVE | Noted: 2024-05-20

## 2024-07-15 ENCOUNTER — OFFICE VISIT (OUTPATIENT)
Dept: PULMONOLOGY | Age: 29
End: 2024-07-15
Payer: COMMERCIAL

## 2024-07-15 VITALS
TEMPERATURE: 97.6 F | HEART RATE: 90 BPM | BODY MASS INDEX: 30.83 KG/M2 | WEIGHT: 174 LBS | DIASTOLIC BLOOD PRESSURE: 68 MMHG | SYSTOLIC BLOOD PRESSURE: 122 MMHG | OXYGEN SATURATION: 98 % | HEIGHT: 63 IN

## 2024-07-15 DIAGNOSIS — R06.02 SHORTNESS OF BREATH: ICD-10-CM

## 2024-07-15 DIAGNOSIS — J45.40 MODERATE PERSISTENT ASTHMA, UNSPECIFIED WHETHER COMPLICATED: Primary | ICD-10-CM

## 2024-07-15 DIAGNOSIS — R05.9 COUGH, UNSPECIFIED TYPE: ICD-10-CM

## 2024-07-15 DIAGNOSIS — E66.9 OBESITY, UNSPECIFIED CLASSIFICATION, UNSPECIFIED OBESITY TYPE, UNSPECIFIED WHETHER SERIOUS COMORBIDITY PRESENT: ICD-10-CM

## 2024-07-15 PROCEDURE — 99214 OFFICE O/P EST MOD 30 MIN: CPT | Performed by: INTERNAL MEDICINE

## 2024-07-15 PROCEDURE — G8417 CALC BMI ABV UP PARAM F/U: HCPCS | Performed by: INTERNAL MEDICINE

## 2024-07-15 PROCEDURE — G8427 DOCREV CUR MEDS BY ELIG CLIN: HCPCS | Performed by: INTERNAL MEDICINE

## 2024-07-15 PROCEDURE — 1036F TOBACCO NON-USER: CPT | Performed by: INTERNAL MEDICINE

## 2024-07-15 NOTE — PROGRESS NOTES
NEW PATIENT VISIT-PULMONARY/SLEEP    7/15/2024     REFERRING PHYSICIAN:  Katarina Springer APRN - NP     REASON FOR REFERRAL:  Asthma    HPI:     Ga Luz is a 29 y.o. female who was referred to pulmonary clinic for evaluation.     She was diagnosed with asthma last year due to shortness of breath, chest tightness and wheezing.  She had a PFT last year which showed normal spirometry but had good response in mid flows.  Has been started on a rescue inhaler and maintenance inhaler but was not able to afford maintenance inhaler.  Had recurrent symptoms of shortness of breath, chest tightness and wheezing.  Symptoms are worse at night.  Has been using nebulizers and rescue inhalers more frequently at night.  Was started recently on Singulair.  Snores but unclear about witnessed apneas.  She wakes up sometimes gasping for air.  She is tired and sleepy during the day and she dozes off easily.      7/15/24:    She is back for follow-up. Home sleep study done and showed no significant respiratory events.  Respiratory symptoms has been a bit worse with the weather changes.  Has been coughing more and having chest tightness.  She needs to have a breathing treatment prior to bedtime.  Continues to be compliant with Symbicort.  Has been on Singulair as well.  Using rescue inhaler 2-3 times daily.    Past Medical History   Past Medical History:   Diagnosis Date    Asthma     Endometriosis        Past Surgical History  Past Surgical History:   Procedure Laterality Date    DILATION AND CURETTAGE OF UTERUS N/A 5/1/2023    HYSTEROSCOPY, DILATION & CURETTAGE, OPERATIVE LAPAROSCOPY, TUBAL LAVAGE performed by Grover Doshi, DO at [facility] OR    LAPAROSCOPY      TONSILLECTOMY      WISDOM TOOTH EXTRACTION         Allergies  Allergies   Allergen Reactions    Acetaminophen-Pamabrom Itching    Midol [Acetaminophen]     Pamabrom        Medications  Current Outpatient Medications   Medication Sig Dispense Refill

## 2024-09-12 ENCOUNTER — OFFICE VISIT (OUTPATIENT)
Dept: ORTHOPEDIC SURGERY | Facility: CLINIC | Age: 29
End: 2024-09-12
Payer: COMMERCIAL

## 2024-09-12 VITALS — BODY MASS INDEX: 31.58 KG/M2 | WEIGHT: 185 LBS | HEIGHT: 64 IN

## 2024-09-12 DIAGNOSIS — G56.01 CARPAL TUNNEL SYNDROME OF RIGHT WRIST: Primary | ICD-10-CM

## 2024-09-12 PROCEDURE — 1036F TOBACCO NON-USER: CPT | Performed by: ORTHOPAEDIC SURGERY

## 2024-09-12 PROCEDURE — 3008F BODY MASS INDEX DOCD: CPT | Performed by: ORTHOPAEDIC SURGERY

## 2024-09-12 PROCEDURE — 99214 OFFICE O/P EST MOD 30 MIN: CPT | Performed by: ORTHOPAEDIC SURGERY

## 2024-09-12 RX ORDER — SUMATRIPTAN 50 MG/1
50 TABLET, FILM COATED ORAL ONCE AS NEEDED
COMMUNITY
Start: 2024-04-22

## 2024-09-12 RX ORDER — MONTELUKAST SODIUM 10 MG/1
1 TABLET ORAL NIGHTLY
COMMUNITY
Start: 2024-06-27

## 2024-09-12 RX ORDER — CETIRIZINE HYDROCHLORIDE 10 MG/1
10 TABLET ORAL
COMMUNITY
Start: 2024-04-25 | End: 2025-02-15

## 2024-09-12 NOTE — PROGRESS NOTES
History present illness: Patient presents today for evaluation of symptomatic right carpal tunnel syndrome.  Steroid injection delivered last month brought about clear distinct relief that was complete in nature.  Now symptoms have returned.  Patient describes his symptoms compatible with right carpal tunnel syndrome.  EMG from February 2024 came back as normal study.  She takes no blood thinners.  She is highly symptomatic today.      Past medical history: The patient's past medical history, family history, social history, and review of systems were documented on the patient medical intake.  The updated data was reviewed in the electronic medical record.  History is negative except otherwise stated in history of present illness.        Physical examination:  General: Alert and oriented to person, place, and time.  No acute distress and breathing comfortably: Pleasant and cooperative with examination.  HEENT: Head is normocephalic and atraumatic.  Neck: Supple, no visible swelling.  Cardiovascular: No palpable tachycardia  Lungs: No audible wheezing or labored breathing  Abdomen: Nondistended.  Extremities: Evaluation of the right upper extremity finds the patient had palpable radial artery at the wrist with brisk capillary refill to all digits.  Patient has intact sensation to axillary radial median and ulnar nerves.  There are no open wounds.  There are no signs of infection.  There is no evidence of lymphedema or lymphatic streaking.  The patient has supple compartments to right arm forearm and hand.  Positive Tinel's over course of median nerve to right wrist.  Positive dry compression test and Phalen's maneuver.      Radiology:      Assessment: Right carpal tunnel syndrome     Plan: Treatment options were discussed.  We talked about operative and nonoperative treatment strategies.  We talked about intraoperative techniques and postoperative protocols.  Patient understands that EMG nerve conduction study test  came back as normal study which is not perfectly aligned with her diagnosis.  She also understands that False negative results can occur 5 to 10% of the time on EMG related to diagnosis of carpal tunnel syndrome.  Patient elects to proceed forth with surgery by way of right carpal tunnel release.  Plan for wide-awake approach to anesthesia.        Procedure:

## 2024-09-20 PROBLEM — G56.01 CARPAL TUNNEL SYNDROME, RIGHT UPPER LIMB: Status: ACTIVE | Noted: 2024-09-20

## 2024-10-14 NOTE — DISCHARGE INSTRUCTIONS
Medication given may have significant effects after discharge. Therefore on the day of surgery:  1) You must be accompanied by a responsible adult upon discharge and for 24 hours after surgery.  Do not drive a motor vehicle, operate machinery, power tools or appliance, drink alcoholic beverages, or make critical decisions for 24 hours.  2) Be aware of dizziness, which may cause a fall. Change positions slowly.  3) Eating: you may resume your regular diet but it is better to increase intake slowly with mild foods and working up to your regular diet. No greasy, fried or spicy foods today.  4) Nausea/Vomiting: Nausea and vomiting may occur as you become more active or begin to increase food intake. If this should happen, decrease activity and return to liquids. If the problem persists, call your surgeon  5) Pain: Your surgeon may have given you a prescription for pain medication. Take pain medication with food as prescribed. Pain medication may cause constipation, so drink plenty of fluids. If your pain medication does not provide adequate relief, call your surgeon  6) Urinating: Notify your surgeon if you have not urinated within 12 hours after discharge  7) Ice: Apply ice to operative site for 20 min 5-6 times a day or use Polar care as instructed  8) Dressing:   [x]  Remove dressing in 3 Days   [x]  Leave open to air or apply simple Band-Aid after initial dressing is taken off and incision is dry. (If Steri-Strips are applied, leave them in place.)   [x]  No baths, hots tubs, pools, or submerging in fresh water sources. Okay to begin showering and normal hand washing after dressing removal.     []  Leave dressing in place. Keep dressing/ incision clean and dry.      9) Activity:    Shoulder/ Elbow/Hand:   [x]  Elevate extremity    []  Sling   [] At all times (except for exercises and showering)  [] As needed only for comfort   [] Begin daily motion exercises out of sling as instructed   [x]  Bend and flex  fingers/wrist/elbow frequently   [] Non-weight bearing/No lifting/gripping/squeezing to the surgical limb   [x] No lifting greater than 1 lb until follow-up visit      10) Begin physical therapy if advised by your physician:   [] Before returning to see you doctor    [x] Will discuss possible need at follow-up visit   [] Will be paired with your follow-up visit in [place]    11) Call your doctor at [phone] for an appointment (or follow up as scheduled)    Contact [facility] office if  Increased redness, swelling, drainage of any kind, and/or pain to surgery site.  As well as new onset fevers and or chills.  These could signify an infection.  Calf or thigh tenderness to touch as well as increased swelling or redness.  This could signify a clot formation.  Numbness or tingling to an area around the incision site or below the incision site (toes). Or if the operative extremity becomes cold, blue.  Any rash appears, increased  or new onset nausea/vomiting occur.  This may indicate a reaction to a medication.  Temp is 38.5 C (101F)  12) If you have any concerns or questions, please call [facility] surgeon on call. The 24- hour phone is [phone]  13) If you are unable to contact your surgeon, in an emergency situation, go to the nearest hospital

## 2024-10-23 ENCOUNTER — HOSPITAL ENCOUNTER (OUTPATIENT)
Facility: HOSPITAL | Age: 29
Setting detail: OUTPATIENT SURGERY
Discharge: HOME | End: 2024-10-23
Attending: ORTHOPAEDIC SURGERY | Admitting: ORTHOPAEDIC SURGERY
Payer: COMMERCIAL

## 2024-10-23 ENCOUNTER — ANESTHESIA (OUTPATIENT)
Dept: OPERATING ROOM | Facility: HOSPITAL | Age: 29
End: 2024-10-23
Payer: COMMERCIAL

## 2024-10-23 ENCOUNTER — ANESTHESIA EVENT (OUTPATIENT)
Dept: OPERATING ROOM | Facility: HOSPITAL | Age: 29
End: 2024-10-23
Payer: COMMERCIAL

## 2024-10-23 VITALS
DIASTOLIC BLOOD PRESSURE: 86 MMHG | RESPIRATION RATE: 18 BRPM | HEART RATE: 101 BPM | SYSTOLIC BLOOD PRESSURE: 126 MMHG | BODY MASS INDEX: 31.92 KG/M2 | TEMPERATURE: 97.7 F | WEIGHT: 187 LBS | HEIGHT: 64 IN | OXYGEN SATURATION: 95 %

## 2024-10-23 DIAGNOSIS — G56.01 CARPAL TUNNEL SYNDROME, RIGHT UPPER LIMB: Primary | ICD-10-CM

## 2024-10-23 DIAGNOSIS — G89.18 POST-OP PAIN: ICD-10-CM

## 2024-10-23 LAB — HCG UR QL IA.RAPID: NEGATIVE

## 2024-10-23 PROCEDURE — 2720000007 HC OR 272 NO HCPCS: Performed by: ORTHOPAEDIC SURGERY

## 2024-10-23 PROCEDURE — 81025 URINE PREGNANCY TEST: CPT | Performed by: ORTHOPAEDIC SURGERY

## 2024-10-23 PROCEDURE — 3700000001 HC GENERAL ANESTHESIA TIME - INITIAL BASE CHARGE: Performed by: ORTHOPAEDIC SURGERY

## 2024-10-23 PROCEDURE — 3600000003 HC OR TIME - INITIAL BASE CHARGE - PROCEDURE LEVEL THREE: Performed by: ORTHOPAEDIC SURGERY

## 2024-10-23 PROCEDURE — 7100000009 HC PHASE TWO TIME - INITIAL BASE CHARGE: Performed by: ORTHOPAEDIC SURGERY

## 2024-10-23 PROCEDURE — 7100000010 HC PHASE TWO TIME - EACH INCREMENTAL 1 MINUTE: Performed by: ORTHOPAEDIC SURGERY

## 2024-10-23 PROCEDURE — 3700000002 HC GENERAL ANESTHESIA TIME - EACH INCREMENTAL 1 MINUTE: Performed by: ORTHOPAEDIC SURGERY

## 2024-10-23 PROCEDURE — A64721 PR REVISE MEDIAN N/CARPAL TUNNEL SURG: Performed by: ANESTHESIOLOGY

## 2024-10-23 PROCEDURE — A64721 PR REVISE MEDIAN N/CARPAL TUNNEL SURG: Performed by: ANESTHESIOLOGIST ASSISTANT

## 2024-10-23 PROCEDURE — 3600000008 HC OR TIME - EACH INCREMENTAL 1 MINUTE - PROCEDURE LEVEL THREE: Performed by: ORTHOPAEDIC SURGERY

## 2024-10-23 PROCEDURE — 2500000004 HC RX 250 GENERAL PHARMACY W/ HCPCS (ALT 636 FOR OP/ED): Performed by: ANESTHESIOLOGIST ASSISTANT

## 2024-10-23 RX ORDER — MIDAZOLAM HYDROCHLORIDE 1 MG/ML
INJECTION, SOLUTION INTRAMUSCULAR; INTRAVENOUS AS NEEDED
Status: DISCONTINUED | OUTPATIENT
Start: 2024-10-23 | End: 2024-10-23

## 2024-10-23 RX ORDER — HYDROCODONE BITARTRATE AND ACETAMINOPHEN 5; 325 MG/1; MG/1
1 TABLET ORAL EVERY 8 HOURS PRN
Qty: 6 TABLET | Refills: 0 | Status: SHIPPED | OUTPATIENT
Start: 2024-10-23 | End: 2024-10-25

## 2024-10-23 SDOH — HEALTH STABILITY: MENTAL HEALTH: CURRENT SMOKER: 0

## 2024-10-23 ASSESSMENT — PAIN - FUNCTIONAL ASSESSMENT
PAIN_FUNCTIONAL_ASSESSMENT: 0-10
PAIN_FUNCTIONAL_ASSESSMENT: 0-10

## 2024-10-23 ASSESSMENT — ENCOUNTER SYMPTOMS
NUMBNESS: 1
GASTROINTESTINAL NEGATIVE: 1
CONSTITUTIONAL NEGATIVE: 1
HEMATOLOGIC/LYMPHATIC NEGATIVE: 1
RESPIRATORY NEGATIVE: 1
EYES NEGATIVE: 1
ENDOCRINE NEGATIVE: 1
PSYCHIATRIC NEGATIVE: 1
CARDIOVASCULAR NEGATIVE: 1

## 2024-10-23 ASSESSMENT — COLUMBIA-SUICIDE SEVERITY RATING SCALE - C-SSRS
6. HAVE YOU EVER DONE ANYTHING, STARTED TO DO ANYTHING, OR PREPARED TO DO ANYTHING TO END YOUR LIFE?: NO
1. IN THE PAST MONTH, HAVE YOU WISHED YOU WERE DEAD OR WISHED YOU COULD GO TO SLEEP AND NOT WAKE UP?: NO
2. HAVE YOU ACTUALLY HAD ANY THOUGHTS OF KILLING YOURSELF?: NO

## 2024-10-23 ASSESSMENT — PAIN SCALES - GENERAL: PAINLEVEL_OUTOF10: 0 - NO PAIN

## 2024-10-23 NOTE — ANESTHESIA POSTPROCEDURE EVALUATION
Patient: Joey Cortes    Procedure Summary       Date: 10/23/24 Room / Location: [facility] OR 05 / Virtual [facility] OR    Anesthesia Start: 0807 Anesthesia Stop: 0826    Procedure: RIGHT CARPAL TUNNEL RELEASE (Right: Wrist) Diagnosis:       Carpal tunnel syndrome, right upper limb      (Carpal tunnel syndrome, right upper limb [G56.01])    Surgeons: Gerald Bull DO Responsible Provider: Nitish Campbell MD    Anesthesia Type: MAC ASA Status: 2            Anesthesia Type: MAC    Vitals Value Taken Time   /80 10/23/24 0826   Temp 36.5 10/23/24 0826   Pulse 90 10/23/24 0826   Resp 12 10/23/24 0826   SpO2 99 10/23/24 0826       Anesthesia Post Evaluation    Patient location during evaluation: PACU  Patient participation: complete - patient participated  Level of consciousness: awake and alert  Pain management: satisfactory to patient  Airway patency: patent  Cardiovascular status: acceptable  Respiratory status: acceptable, room air and spontaneous ventilation  Hydration status: acceptable  Postoperative Nausea and Vomiting: none      No notable events documented.

## 2024-10-23 NOTE — ANESTHESIA PROCEDURE NOTES
Peripheral Block    Start time: 10/23/2024 7:45 AM  End time: 10/23/2024 7:49 AM  Reason for block: at surgeon's request and post-op pain management  Staffing  Performed: attending   Authorized by: Nitish Campbell MD    Performed by: Nitish Campbell MD  Preanesthetic Checklist  Completed: patient identified, IV checked, site marked, risks and benefits discussed, surgical consent, monitors and equipment checked, pre-op evaluation and timeout performed   Timeout performed at: 10/23/2024 7:44 AM  Peripheral Block  Prep: ChloraPrep  Block type: other  Laterality: right  Injection technique: single-shot  Local infiltration: lidocaine  Infiltration strength: 1 %  Dose: 20 mL  Needle  Needle type: short-bevel   Needle gauge: 21 G  Needle localization: anatomical landmarks  Assessment  Injection assessment: negative aspiration for heme, no paresthesia on injection, incremental injection and local visualized surrounding nerve on ultrasound  Additional Notes  Median nerve block performed as above.

## 2024-10-23 NOTE — OP NOTE
RIGHT CARPAL TUNNEL RELEASE (R) Operative Note     Date: 10/23/2024  OR Location: [facility] OR    Name: Joey Cortes, : 1995, Age: 29 y.o., MRN: [id number], Sex: female    Preoperative diagnosis: Right carpal tunnel syndrome    Postoperative diagnosis: Right carpal tunnel syndrome    Procedure planned: Right carpal tunnel release    Procedure performed: Right carpal tunnel release    Surgeon: Gerald Bull D.O.    Assistant: None    Anesthesia: Local field block using lidocaine with epinephrine solution and monitored by the anesthesia team    Estimated blood loss: Less than 10 mL    Drains: None    Tourniquet: None    Specimens: None    Implants: None    Indications: The patient presented to the office with subjective symptoms physical examination an EMG nerve conduction study test consistent with right carpal tunnel syndrome.  The patient had failure of nonoperative treatment strategies.  After full discussion regarding risks benefits and alternatives the patient elected to forego any additional nonoperative management in favor of right carpal tunnel release.  Informed consent was signed and placed in the chart.    Complications: None noted at the time of surgery    Description of operation: The patient was taken to the operative suite and placed in the supine position on the operating table.  A timeout was performed and the right carpal tunnel was confirmed to be the operative site.  The patient was carefully positioned on the table in such a fashion as to pad all bony prominences and peripheral nerves.  The patient was then prepped and draped in the normal sterile fashion.  After prepping and draping a longitudinal incision was marked out directly overlying the transverse carpal ligament in line with the ring finger ray.  Forceps were used to gently grasp and pinch the skin in the zone of intended surgical dissection to check for adequate anesthesia.  After confirming adequate anesthesia the 15 blade was used  to incise skin and dissect down to the subcutaneous plane.  The palmar aponeurosis was divided in line with the incision to expose the transverse carpal ligament.  The transverse carpal ligament was then meticulously divided under direct visualization, working from distal to proximal, taking care to avoid iatrogenic injury to the underlying contents of the carpal tunnel.  The tenotomy scissors were used to release the most proximal fibers of the transverse carpal ligament along with the most distal fibers of the antebrachial fascia.  This was done under direct visualization.  The distal release of the transverse carpal ligament was carried to the level of the fat change.  Direct inspection and digital palpation confirmed complete release of the carpal tunnel.  Copious irrigation was performed.  Interrupted nylon stitches were used to close the skin.  A bulky nonadherent dressing was placed.  The patient was then allowed to head to recovery in stable condition.  Overall the patient tolerated the procedure well.    Disposition: Stable to PACU      Gerald Bull  Phone Number: [phone]

## 2024-10-23 NOTE — ANESTHESIA PREPROCEDURE EVALUATION
Patient: Joey Cortes    Evaluation Method: In-person visit    Procedure Information       Date/Time: 10/23/24 0822    Procedure: RIGHT CARPAL TUNNEL RELEASE (Right: Wrist) - WIDE AWAKE BLOCK DONE BY ANESTHESIA    Location: [facility] OR  / [facility]    Surgeons: Gerald Bull, DO            Relevant Problems   Neuro   (+) Carpal tunnel syndrome, right upper limb      Musculoskeletal   (+) Carpal tunnel syndrome, right upper limb       Clinical information reviewed:   Tobacco  Allergies  Meds   Med Hx  Surg Hx   Fam Hx  Soc Hx        NPO Detail:  NPO/Void Status  Carbohydrate Drink Given Prior to Surgery? : N  Date of Last Liquid: 10/22/24  Time of Last Liquid: 2100  Date of Last Solid: 10/22/24  Time of Last Solid: 2100  Last Intake Type: Clear fluids; Light meal  Time of Last Void: 0727         OB/GYN     Physical Exam    Airway  Mallampati: II     Cardiovascular   Rhythm: regular  Rate: normal     Dental - normal exam     Pulmonary   Breath sounds clear to auscultation     Abdominal            Anesthesia Plan    History of general anesthesia?: yes  History of complications of general anesthesia?: no    ASA 2     regional     The patient is not a current smoker.    intravenous induction   Anesthetic plan and risks discussed with patient.    Plan discussed with [facility].

## 2024-10-23 NOTE — H&P
History and Physical      CHIEF COMPLAINT:  No chief complaint on file.       HISTORY OF PRESENT ILLNESS:      The patient is a 29 y.o. female with  right carpal tunnel syndrome recalcitrant to nonoperative treatment strategies.    Past Medical History:  Past Medical History:   Diagnosis Date    Baker's cyst N/a    Endometriosis       Past Surgical History:    Past Surgical History:   Procedure Laterality Date    OTHER SURGICAL HISTORY  09/11/2020    Tonsillectomy    OTHER SURGICAL HISTORY  09/11/2020    Laparoscopy         Medications Prior to Admission:    No current facility-administered medications on file prior to encounter.     Current Outpatient Medications on File Prior to Encounter   Medication Sig Dispense Refill    albuterol 90 mcg/actuation inhaler Inhale 2 puffs every 4 hours if needed for wheezing. 18 g 0    cetirizine (ZyrTEC) 10 mg tablet Take 1 tablet (10 mg) by mouth once daily.      montelukast (Singulair) 10 mg tablet Take 1 tablet (10 mg) by mouth once daily at bedtime.      prenatal vitamin, iron-folic, (Prenatal Vitamin) 27 mg iron-800 mcg folic acid tablet Take 1 tablet by mouth once daily.      SUMAtriptan (Imitrex) 50 mg tablet Take 1 tablet (50 mg) by mouth 1 time if needed for migraine.         Allergies:  Acetaminophen-caff-pyrilamine, Ibuprofen, Midol [acetaminophen-pamabrom], and Pamabrom    Social History:   Social History     Socioeconomic History    Marital status: Single     Spouse name: Not on file    Number of children: Not on file    Years of education: Not on file    Highest education level: Not on file   Occupational History    Not on file   Tobacco Use    Smoking status: Never    Smokeless tobacco: Never   Substance and Sexual Activity    Alcohol use: Never    Drug use: Never    Sexual activity: Yes     Partners: Male     Birth control/protection: None   Other Topics Concern    Not on file   Social History Narrative    Not on file     Social Drivers of Health      Financial Resource Strain: Low Risk  (4/22/2024)    Received from [facility]    Overall Financial Resource Strain (CARDIA)     Difficulty of Paying Living Expenses: Not very hard   Food Insecurity: Food Insecurity Present (4/22/2024)    Received from [facility]    Hunger Vital Sign     Worried About Running Out of Food in the Last Year: Sometimes true     Ran Out of Food in the Last Year: Sometimes true   Transportation Needs: No Transportation Needs (4/22/2024)    Received from [facility]    PRAPARE - Transportation     Lack of Transportation (Medical): No     Lack of Transportation (Non-Medical): No   Physical Activity: Patient Declined (4/22/2024)    Received from [facility]    Exercise Vital Sign     Days of Exercise per Week: Patient declined     Minutes of Exercise per Session: Patient declined   Stress: No Stress Concern Present (4/22/2024)    Received from [facility]    [employer] - Occupational Stress Questionnaire     Feeling of Stress : Not at all   Social Connections: Unknown (4/22/2024)    Received from [facility]    Social Connection and Isolation Panel [NHANES]     Frequency of Communication with Friends and Family: More than three times a week     Frequency of Social Gatherings with Friends and Family: More than three times a week     Attends [employer] Services: Patient declined     Active Member of Clubs or Organizations: No     Attends Club or Organization Meetings: Never     Marital Status: Never    Intimate Partner Violence: Not on file       Family History:   No family history on file.    Review of Systems   Constitutional: Negative.    HENT: Negative.     Eyes: Negative.    Respiratory: Negative.     Cardiovascular: Negative.    Gastrointestinal: Negative.    Endocrine: Negative.    Genitourinary: Negative.     Neurological:  Positive for numbness (Right hand median nerve distribution).   Hematological: Negative.    Psychiatric/Behavioral: Negative.          Vitals:  LMP 11/10/2023     Physical Exam  Constitutional:       Appearance: Normal appearance.   HENT:      Head: Normocephalic and atraumatic.      Mouth/Throat:      Mouth: Mucous membranes are moist.   Eyes:      Pupils: Pupils are equal, round, and reactive to light.   Cardiovascular:      Pulses: Normal pulses.      Comments: Appears grossly hemodynamically stable  Pulmonary:      Effort: Pulmonary effort is normal.      Breath sounds: Normal breath sounds. No wheezing.   Abdominal:      Palpations: Abdomen is soft.   Musculoskeletal:         General: Normal range of motion.      Cervical back: Neck supple.      Comments: Positive Tinel's right median nerve   Skin:     General: Skin is warm and dry.      Capillary Refill: Capillary refill takes less than 2 seconds.   Neurological:      Mental Status: She is alert and oriented to person, place, and time.   Psychiatric:         Mood and Affect: Mood normal.         Behavior: Behavior normal.         Thought Content: Thought content normal.         Judgment: Judgment normal.            Assessment:  Right carpal tunnel syndrome recalcitrant to nonoperative treatment strategies.      Plan:  Scheduled for right carpal tunnel release under wide-awake approach to anesthesia.     Radha Nobles PA-C  10/23/2024  6:55 AM

## 2024-11-05 ENCOUNTER — OFFICE VISIT (OUTPATIENT)
Dept: ORTHOPEDIC SURGERY | Facility: CLINIC | Age: 29
End: 2024-11-05
Payer: COMMERCIAL

## 2024-11-05 DIAGNOSIS — G56.01 CARPAL TUNNEL SYNDROME OF RIGHT WRIST: Primary | ICD-10-CM

## 2024-11-05 PROCEDURE — 99211 OFF/OP EST MAY X REQ PHY/QHP: CPT | Performed by: ORTHOPAEDIC SURGERY

## 2024-11-05 PROCEDURE — 99024 POSTOP FOLLOW-UP VISIT: CPT | Performed by: ORTHOPAEDIC SURGERY

## 2024-11-05 PROCEDURE — 1036F TOBACCO NON-USER: CPT | Performed by: ORTHOPAEDIC SURGERY

## 2024-11-05 NOTE — PROGRESS NOTES
"    11/5/2024    Chief Complaint   Patient presents with    Right Hand - Post-op     CTR 10/23/24       History of Present Illness:  Patient Joey Cortes , 29 y.o. female, presents today, 11/5/2024, for evaluation of right hand  carpal tunnel release, approximately 2 weeks postop .  She describes soreness and swelling throughout the hand, numbness and tingling is overall \"a little better\", but she still reports intermittent persistence of symptoms.  She has a hard time with gripping and squeezing.  Denies any fevers, chills, constitutional symptoms.       Review of Systems:   GENERAL: Negative  GI: Negative  MUSCULOSKELETAL: See HPI  SKIN: Negative  NEURO:  Negative     Physical Exam:  GENERAL:  Alert and oriented to person, place, and time.  No acute distress and breathing comfortably; pleasant and cooperative with the examination.  HEENT:  Head is normocephalic and atraumatic.  NECK:  Supple, no visible swelling.  CARDIOVASCULAR:  No palpable tachycardia.  LUNGS:  No audible wheezing or labored breathing.  ABDOMEN:  Nondistended.  Extremities: The surgical incision is clean, dry, intact, and appears to be healing well.  No active bleeding, erythema, warmth, drainage, or signs of infection.  Appropriate functional ROM demonstrated with flexion/extension of the digits, and flexion/extension/pronosupination of the wrist.     Imaging/Test Results:  None today.     Assessment:  Right carpal tunnel release, approximately 2 weeks postop with some improvement of numbness and tingling but incomplete relief at this stage.     Plan:  Sutures were removed in the office today.  The patient can begin to weight bear as tolerated.  We discussed and reviewed home exercise program for range of motion recovery, scar massage, and desensitization techniques.  They can return to activities as tolerated.  The patient will follow-up with our office on an as needed basis if symptoms resolve, if not she will follow-up in 8 weeks for " repeat clinical exam.  All patient questions answered at today's visit.    Radha Nobles PA-C

## 2024-11-05 NOTE — LETTER
November 5, 2024     Patient: Joey Cortes   YOB: 1995   Date of Visit: 11/5/2024       To Whom It May Concern:    It is my medical opinion that Joey Cortes may return to work on 11/6/2024 with no restrictions .    If you have any questions or concerns, please don't hesitate to call.         Sincerely,        Gerald Bull, DO

## 2025-01-01 ENCOUNTER — TELEMEDICINE (OUTPATIENT)
Dept: PRIMARY CARE | Facility: CLINIC | Age: 30
End: 2025-01-01
Payer: COMMERCIAL

## 2025-01-01 DIAGNOSIS — R09.81 CHRONIC NASAL CONGESTION: Primary | ICD-10-CM

## 2025-01-01 PROCEDURE — 1036F TOBACCO NON-USER: CPT | Performed by: FAMILY MEDICINE

## 2025-01-01 PROCEDURE — 99213 OFFICE O/P EST LOW 20 MIN: CPT | Performed by: FAMILY MEDICINE

## 2025-01-01 ASSESSMENT — ENCOUNTER SYMPTOMS
CONSTIPATION: 0
COUGH: 0
FEVER: 0
PSYCHIATRIC NEGATIVE: 1
APPETITE CHANGE: 0
DIARRHEA: 0
SINUS PRESSURE: 1
SHORTNESS OF BREATH: 0
RHINORRHEA: 0
SORE THROAT: 0
VOMITING: 0
EYE REDNESS: 0
WHEEZING: 0
EYE PAIN: 0
NAUSEA: 0
ACTIVITY CHANGE: 0
FATIGUE: 0
HEADACHES: 0
CHILLS: 0
EYE ITCHING: 0
SINUS PAIN: 0
MYALGIAS: 0

## 2025-01-01 NOTE — PROGRESS NOTES
"Subjective   Patient ID: Joey Cortes is a 29 y.o. female who presents for sinus congestion.     An interactive audio and video telecommunication system which permits real time communications between the patient (at the originating site) and provider (at the distant site) was utilized to provide this telehealth service. At the start of the visit, I introduced myself as the nurse practitioner for their visit today, Cristina LIMON. I then verified the patients name, , and current physical location. Patient confirmed their current location was IN THE [facility]. If they were currently outside of the [facility], the call was terminated and the patient was directed to alternative means of care. The patient was made aware of the limitations of the telehealth visit. They will not be physically examined and all issues may not be appropriate for a telehealth visit. If at any time this provider felt the visit was not appropriate for a video visit or more advanced care was necessary, the call would be terminated and the patient would be advised to seek proper, in person, medical attention. Pt verbalizes understanding and agrees to continue with televisit.     Pt presents with request for referral to ENT for chronic nasal congestion. Nasal congestion is constant. Has seen allergist and was told all testing was normal. Was told she should see ENT. Taking \"asthma meds\" and zrytec. No nasal sprays. Clear nasal mucus.          Review of Systems   Constitutional:  Negative for activity change, appetite change, chills, fatigue and fever.   HENT:  Positive for congestion and sinus pressure. Negative for ear discharge, ear pain, postnasal drip, rhinorrhea, sinus pain, sneezing and sore throat.    Eyes:  Negative for pain, redness and itching.   Respiratory:  Negative for cough, shortness of breath and wheezing.    Cardiovascular:  Negative for chest pain.   Gastrointestinal:  Negative for constipation, diarrhea, nausea " and vomiting.   Genitourinary:  Negative for decreased urine volume.   Musculoskeletal:  Negative for myalgias.   Neurological:  Negative for headaches.   Psychiatric/Behavioral: Negative.         Objective   There were no vitals taken for this visit.    Physical Exam  Vitals and nursing note reviewed.   Constitutional:       General: She is not in acute distress.  Eyes:      Conjunctiva/sclera: Conjunctivae normal.   Neurological:      General: No focal deficit present.      Mental Status: She is alert. Mental status is at baseline.   Psychiatric:         Mood and Affect: Mood normal.         Behavior: Behavior normal.         Thought Content: Thought content normal.         Judgment: Judgment normal.       Physical exam limited d/t video visit.   Assessment/Plan   Diagnoses and all orders for this visit:  Chronic nasal congestion  -     Referral to ENT; Future    - referral to ENT for chronic sinus congestion.      The total time spent on this visit was 15 min. This time includes, but is not limited to, reviewing the patient's history, labs, test results, allergies, current medications, interviewing, assessing, examining, diagnosing, counseling, education, placing orders, documenting and care coordination.

## 2025-01-02 ENCOUNTER — OFFICE VISIT (OUTPATIENT)
Dept: ORTHOPEDIC SURGERY | Facility: CLINIC | Age: 30
End: 2025-01-02
Payer: COMMERCIAL

## 2025-01-02 DIAGNOSIS — G56.01 CARPAL TUNNEL SYNDROME OF RIGHT WRIST: Primary | ICD-10-CM

## 2025-01-02 PROCEDURE — 99211 OFF/OP EST MAY X REQ PHY/QHP: CPT | Performed by: ORTHOPAEDIC SURGERY

## 2025-01-02 NOTE — PROGRESS NOTES
"    1/2/2025    Chief Complaint   Patient presents with    Right Hand - Follow-up     CTR 10/23/24       History of Present Illness:  Patient Joey Cortes , 29 y.o. female, presents today, 1/2/2025, for evaluation of right hand  carpal tunnel release, about 10 weeks postop .  She states that numbness and tingling is gone, however she feels she is still having some occasional \"flareups\" of pain.  She primarily localizes this over the thenar aspect of the hand.  She denies any new injury or trauma.       Review of Systems:   GENERAL: Negative  GI: Negative  MUSCULOSKELETAL: See HPI  SKIN: Negative  NEURO:  Negative     Physical Exam:  GENERAL:  Alert and oriented to person, place, and time.  No acute distress and breathing comfortably; pleasant and cooperative with the examination.  HEENT:  Head is normocephalic and atraumatic.  NECK:  Supple, no visible swelling.  CARDIOVASCULAR:  No palpable tachycardia.  LUNGS:  No audible wheezing or labored breathing.  ABDOMEN:  Nondistended.  Extremities: Evaluation of the right upper extremity finds the patient to have a palpable radial artery at the wrist with brisk capillary refill to all digits. The patient has intact sensorium to axillary, radial, median and ulnar nerves. There are no open wounds. There are no signs of infection. There is no evidence of lymphedema or lymphatic streaking. The patient has supple compartments of the right arm, forearm and hand.  Surgical incision is well-healed.  She has some mild tenderness over the thenar aspect of the thumb and firmness but she states this is somewhat better than it was before.     Imaging/Test Results:  None today.     Assessment:  Right carpal tunnel release, 2 and half months postop with resolution of numbness and tingling but some persistence of pillar pain.     Plan:  We discussed that pillar pain can be very common after surgery.  Her numbness and tingling is resolved.  We recommend continued weightbearing and " activities as tolerated.  Reviewed home exercise program for scar massage and desensitization.  Follow-up again in 2 months for repeat clinical exam, no x-rays necessary upon return.  All questions answered at today's visit.    Radha Nobles PA-C

## 2025-01-22 ENCOUNTER — APPOINTMENT (OUTPATIENT)
Dept: OTOLARYNGOLOGY | Facility: CLINIC | Age: 30
End: 2025-01-22
Payer: COMMERCIAL

## 2025-01-22 DIAGNOSIS — J34.89 NASAL OBSTRUCTION: Primary | ICD-10-CM

## 2025-01-22 DIAGNOSIS — J34.3 HYPERTROPHY OF BOTH INFERIOR NASAL TURBINATES: ICD-10-CM

## 2025-01-22 DIAGNOSIS — J34.2 DNS (DEVIATED NASAL SEPTUM): ICD-10-CM

## 2025-01-22 DIAGNOSIS — J33.9 NASAL POLYP: ICD-10-CM

## 2025-01-22 PROCEDURE — 99203 OFFICE O/P NEW LOW 30 MIN: CPT | Performed by: OTOLARYNGOLOGY

## 2025-01-22 PROCEDURE — 31231 NASAL ENDOSCOPY DX: CPT | Performed by: OTOLARYNGOLOGY

## 2025-01-22 ASSESSMENT — ENCOUNTER SYMPTOMS
NEUROLOGICAL NEGATIVE: 1
CONSTITUTIONAL NEGATIVE: 1
RESPIRATORY NEGATIVE: 1
CARDIOVASCULAR NEGATIVE: 1

## 2025-01-22 NOTE — PROGRESS NOTES
Subjective   Patient ID: Joey Cortes is a 29 y.o. female who presents for Nasal Congestion.    HPI  Patient presents today with chronic nasal obstruction.  She has had some clear drainage but no jatin purulence.  No prior history of polyps.  All remaining ENT inquiry is otherwise grossly clear.  No response to use of topical sprays.  Additionally no benefit from a course of prednisone.  There have been no significant changes in past medical or past surgical histories except as mentioned.    Review of Systems   Constitutional: Negative.    HENT: Negative.     Respiratory: Negative.     Cardiovascular: Negative.    Neurological: Negative.        Physical Exam    General appearance: No acute distress. Normal facies. Symmetric facial movement. No gross lesions of the face are noted.  The external ear structures appear normal. The ear canals patent and the tympanic membranes are intact without evidence of air-fluid levels, retraction, or congenital defects.  Anterior rhinoscopy notes essentially a midline nasal septum. Examination is noted for normal healthy mucosal membranes without any evidence of lesions, polyps, or exudate. The tongue is normally mobile. There are no lesions on the gingiva, buccal, or oral mucosa. There are no oral cavity masses.  The neck is negative for mass lymphadenopathy. The trachea and parotid are clear. The thyroid bed is grossly unremarkable. The salivary gland structures are grossly unremarkable.    Procedure:  After topical anesthesia patient underwent dedicated nasal endoscopy bilaterally revealing evidence of polypoid degeneration of the middle turbinates.  Subtle septal deviation is noted as well.  Patient tolerated well.    Assessment/Plan     29-year-old with chronic nasal obstruction.  She has evidence of mild deviation of septum superimposed with chronic rhinitis picture and turbinate prominence including polypoid degeneration of the turbinates.  I will send her for dedicated CT  and I will see her back to review and plan definitive intervention.  It is to be noted again that she has already failed trial of systemic prednisone and nasal steroid sprays.

## 2025-02-04 ENCOUNTER — APPOINTMENT (OUTPATIENT)
Dept: RADIOLOGY | Facility: HOSPITAL | Age: 30
End: 2025-02-04
Payer: COMMERCIAL

## 2025-02-19 ENCOUNTER — APPOINTMENT (OUTPATIENT)
Dept: RADIOLOGY | Facility: CLINIC | Age: 30
End: 2025-02-19
Payer: COMMERCIAL

## 2025-03-04 ENCOUNTER — APPOINTMENT (OUTPATIENT)
Dept: ORTHOPEDIC SURGERY | Facility: CLINIC | Age: 30
End: 2025-03-04
Payer: COMMERCIAL

## 2025-03-05 ENCOUNTER — APPOINTMENT (OUTPATIENT)
Dept: RADIOLOGY | Facility: HOSPITAL | Age: 30
End: 2025-03-05
Payer: COMMERCIAL

## 2025-03-07 ENCOUNTER — APPOINTMENT (OUTPATIENT)
Dept: OTOLARYNGOLOGY | Facility: CLINIC | Age: 30
End: 2025-03-07
Payer: COMMERCIAL

## 2025-03-13 RX ORDER — BUDESONIDE, GLYCOPYRROLATE, AND FORMOTEROL FUMARATE 160; 9; 4.8 UG/1; UG/1; UG/1
AEROSOL, METERED RESPIRATORY (INHALATION)
Qty: 10.7 EACH | Refills: 3 | OUTPATIENT
Start: 2025-03-13

## 2025-04-07 ENCOUNTER — APPOINTMENT (OUTPATIENT)
Dept: OBSTETRICS AND GYNECOLOGY | Facility: CLINIC | Age: 30
End: 2025-04-07
Payer: COMMERCIAL

## 2025-04-07 VITALS — SYSTOLIC BLOOD PRESSURE: 117 MMHG | WEIGHT: 188.8 LBS | DIASTOLIC BLOOD PRESSURE: 81 MMHG | BODY MASS INDEX: 32.41 KG/M2

## 2025-04-07 DIAGNOSIS — Z34.90 PRENATAL CARE, ANTEPARTUM, UNSPECIFIED GRAVIDITY: ICD-10-CM

## 2025-04-07 DIAGNOSIS — Z3A.09 9 WEEKS GESTATION OF PREGNANCY (HHS-HCC): Primary | ICD-10-CM

## 2025-04-07 DIAGNOSIS — Z87.51 HISTORY OF PRETERM DELIVERY: ICD-10-CM

## 2025-04-07 DIAGNOSIS — O99.210 OBESITY IN PREGNANCY (HHS-HCC): ICD-10-CM

## 2025-04-07 DIAGNOSIS — O21.9 NAUSEA AND VOMITING IN PREGNANCY PRIOR TO 22 WEEKS GESTATION: ICD-10-CM

## 2025-04-07 DIAGNOSIS — J45.909 ASTHMA AFFECTING PREGNANCY IN FIRST TRIMESTER (HHS-HCC): ICD-10-CM

## 2025-04-07 DIAGNOSIS — O99.511 ASTHMA AFFECTING PREGNANCY IN FIRST TRIMESTER (HHS-HCC): ICD-10-CM

## 2025-04-07 PROCEDURE — 99204 OFFICE O/P NEW MOD 45 MIN: CPT | Performed by: OBSTETRICS & GYNECOLOGY

## 2025-04-07 PROCEDURE — 96160 PT-FOCUSED HLTH RISK ASSMT: CPT | Performed by: OBSTETRICS & GYNECOLOGY

## 2025-04-07 RX ORDER — ONDANSETRON 4 MG/1
4 TABLET, FILM COATED ORAL EVERY 8 HOURS PRN
Qty: 30 TABLET | Refills: 3 | Status: SHIPPED | OUTPATIENT
Start: 2025-04-07 | End: 2025-06-06

## 2025-04-07 RX ORDER — PRENATAL WITH FERROUS FUM AND FOLIC ACID 3080; 920; 120; 400; 22; 1.84; 3; 20; 10; 1; 12; 200; 27; 25; 2 [IU]/1; [IU]/1; MG/1; [IU]/1; MG/1; MG/1; MG/1; MG/1; MG/1; MG/1; UG/1; MG/1; MG/1; MG/1; MG/1
TABLET ORAL
COMMUNITY
Start: 2025-03-25

## 2025-04-07 ASSESSMENT — EDINBURGH POSTNATAL DEPRESSION SCALE (EPDS)
I HAVE BEEN SO UNHAPPY THAT I HAVE BEEN CRYING: NO, NEVER
THE THOUGHT OF HARMING MYSELF HAS OCCURRED TO ME: NEVER
I HAVE BLAMED MYSELF UNNECESSARILY WHEN THINGS WENT WRONG: NO, NEVER
I HAVE BEEN SO UNHAPPY THAT I HAVE HAD DIFFICULTY SLEEPING: NOT VERY OFTEN
TOTAL SCORE: 1
I HAVE FELT SAD OR MISERABLE: NO, NOT AT ALL
I HAVE BEEN ANXIOUS OR WORRIED FOR NO GOOD REASON: NO, NOT AT ALL
I HAVE FELT SCARED OR PANICKY FOR NO GOOD REASON: NO, NOT AT ALL
I HAVE BEEN ABLE TO LAUGH AND SEE THE FUNNY SIDE OF THINGS: AS MUCH AS I ALWAYS COULD
I HAVE LOOKED FORWARD WITH ENJOYMENT TO THINGS: AS MUCH AS I EVER DID
THINGS HAVE BEEN GETTING ON TOP OF ME: NO, I HAVE BEEN COPING AS WELL AS EVER

## 2025-04-07 NOTE — PROGRESS NOTES
Patient presents for initial OB visit  Urine culture sent today.  LMP: 2025  EPDS = 1  C/O: nausea and vomiting. Previously seen Dr Camejo at [facility], was very upset with her care there.     Nehal Martinez MA II    Routine prenatal visit     Subjective    HPI:  30 y.o.  at 9.3 weeks gestational age by sure LMP.  Unplanned but desired pregnancy. Was not using contraception. USN done today - CRL c/w LMP dating.  EDC per LMP - .  PMH complicated by asthma - well controlled on daily inhaler - has rescue inhaler but rarely uses it.  Also has nasal polyps - seeing ENT for this.    OB history significant for  at 33.0 at home in the toilet.  She was on 17-OHP in her second pregnancy and ultimately had spontaneous labor and  at 37.2 WGA. Discussed screening cervical lengths this pregnancy.  Would not want to do 17-OHP again even if it were still standard of case.    Has nausea/vomiting - requesting prescription - prescription for zofran sent.  Requesting prescription for PMV - sent.  BMI today Body mass index is 32.41 kg/m². Discussed optimal weight gain in pregnancy.  Discussed genetic screening options and carrier screening - wants both - will do at next visit with routine labs.  Discussed collaborative care model and group practice.  Questions answered.    Objective    Vital Signs  /81   Wt 85.6 kg (188 lb 12.8 oz)   LMP 2025 (Exact Date)   BMI 32.41 kg/m²     Joey Cortes is a 30 y.o. yo  at 9w3d here for the following concerns which we addressed today:     Medical Problems       Problem List       Nasal obstruction    Nasal polyp    Hypertrophy of both inferior nasal turbinates    DNS (deviated nasal septum)    9 weeks gestation of pregnancy ([facility])    Overview Signed 2025  1:46 PM by Nusrat Villegas MD     Desired provider in labor: [] CNM  [x] Physician   [] Either Acceptable  [x] Blood Products: [x] Yes, accepts [] No, needs counseling  [x] Initial BMI: 31.40    [] Prenatal Labs: <> next visit   [] Cervical Cancer Screening up to date  [x] Rh status: positive   [x] Screen for IPV and Substance Use Risk  [] Genetic Screening (cfDNA): <>wants cfDNA screening and carrier screening   [] First Trimester Anatomy Screen (11-13.6 wks): <> order placed and she will call to schedule   [] Baby ASA: <> start next visit   [x] Pregnancy dated by: sure LMP c/w 9 week USN     [] Anatomy US: (19-20 wks)  [] Federal Sterilization consent signed (if indicated):  [] 1hr GCT at 24-28wks:  [] Fetal Surveillance (if indicated):  [] Tdap (27-32 wks, may be given up to 36 wks if initial window missed):   [] RSV (32-36 wks) (Sept. to end of ):     [] Feeding Intentions:  [] Postpartum Birth control method:   [] GBS at 36 - 37 wks:  [] 39 weeks discussion of IOL vs. Expectant management:  [] Mode of delivery ( anticipated ):           Obesity in pregnancy ([facility])    Overview Signed 2025  1:46 PM by Nusrat Villegas MD     - BMI 32 at first visit          History of  delivery    Overview Signed 2025  1:48 PM by Nusrat Villegas MD     - PTL and unintentional home delivery at 33.0 in first pregnancy  - term  (37.2) with second pregnancy - did receive 17-OHP during this pregnancy   <> screening cervical lengths          Asthma affecting pregnancy in first trimester ([facility])    Overview Signed 2025  1:50 PM by Nusrat Villegas MD     - uses Breztri inhaler BID (combination inhaler including budesonide, glycopyrrolate, and formoterol)  - Has rescue inhaler but rarely uses it            Carpal tunnel syndrome, right upper limb        Follow up in 4 week(s).

## 2025-04-10 LAB — BACTERIA UR CULT: NORMAL

## 2025-04-15 ENCOUNTER — PATIENT MESSAGE (OUTPATIENT)
Dept: OBSTETRICS AND GYNECOLOGY | Facility: CLINIC | Age: 30
End: 2025-04-15
Payer: COMMERCIAL

## 2025-04-29 NOTE — PROGRESS NOTES
Patient: Joey Cortes    [id number]  : 1995 -- AGE 30 y.o.    Provider: MARIAH Alanis     Location [facility]   Service Date: 2025              [facility]  New Visit Note      HISTORY OF PRESENT ILLNESS     The patient's referring provider is: No ref. provider found    HISTORY OF PRESENT ILLNESS   Joey Cortes is a 30 y.o. female who presents to a [facility] for an evaluation with concerns of Asthma, Shortness of Breath, Cough, and Allergies (pollen). I have independently interviewed and examined the patient in the office and reviewed available records.    Current History    On today's visit, the patient reports she is pregnant 14 weeks pregnant, she is having wheezing and shortness of breath.   She is on singulair and albuterol nebs and breztri  Diagnosed with asthma in  -  She is a former smoker 10 years x 1 ppd   Did not have respiratory concerns with previous 2 pregnancies.   She had an inhaler in high school to use during exercise       At baseline,  has dyspnea on exertion and at rest, especially if she is warm. She tried using her inahler but the solution is not aerolizing.  Currently sits for most of the day, works inside the house, but does not carry loads and do strenuous exercise. Has to stop for breath after walking about 100 meters or a few minutes (mMRC 3). Denies orthopnea, pnd, or precious.   Weight has been mostly stable.  Relates dry chronic cough mostly at night, c/o wheezing, and denies green, blood streaks sputum. C/o night cough. No hemoptysis. No fever or shivering chills. Has a runny nose, and a tingling sensation in the back of his throat.  Also complains of heartburn. Denies chest pain    Previous pulmonary history:   previously was told they have asthma and episode of bronchitis and PNA not on O2     Inhalers/nebulized medications: albuterol and singular     Hospitalization  History: Not been hospitalized over the last year for breathing related problem.    Sleep history:  Denies snoring, apnea, feeling tired during the day or taking naps during the day.     ALLERGIES AND MEDICATIONS     ALLERGIES  Allergies[1]    MEDICATIONS  Current Medications[2]      PAST HISTORY     PAST MEDICAL HISTORY  She  has a past medical history of Asthma, Baker's cyst (N/a), Endometriosis, and Headache.    PAST SURGICAL HISTORY  Surgical History[3]    IMMUNIZATION HISTORY  Immunization History   Administered Date(s) Administered    Moderna SARS-CoV-2 Vaccination 12/30/2022       SOCIAL HISTORY  She  reports that she quit smoking about 4 years ago. Her smoking use included cigarettes. She has never used smokeless tobacco. She reports that she does not drink alcohol and does not use drugs. She Patient   She is a former smoker 10 years x 1 ppd quit 4 yrs ago    OCCUPATIONAL/ENVIRONMENTAL HISTORY  Currently works as: [employer]   DOES/DOES NOT EC: does not have known exposure to asbestos, silica, beryllium or inhaled metals.  DOES/DOES NOT EC: does not have exposure to birds or exotic animals.    FAMILY HISTORY  Family History[4]  DOES/DOES NOT EC: does have a family history of pulmonary disease. Grandmother paternal lung cancer and throat cancer   DOES/DOES NOT EC: does have a family history of cancer.  DOES/DOES NOT EC: does not have a family history of autoimmune disorders.    RESULTS/DATA     Pulmonary Function Test Results        Full PFT Study With Bronchodilator 3/30/2023      Referring provider  Kayla Dukes NP    Reading provider  Demetris Todd MD    Test meets ATS criteria for acceptability and reproducibility Yes    Diagnosis: ORO: Yes  Cough   Yes, wheezing Yes  Smoking   quit 1.6 years ago    Spirometry  FVC            3.01 L   82%  Post bronchodilator 3.06 L  83%  Change 1 %  FEV1          2.50 L  80%   Post bronchodilator  2.72 L  87%    Change 8%  FEV1/FVC  82  %             Post  bronchodilator  88 %  PRU96-25% 2.62 L  76%  Post bronchodilator  3.78 L  109%   Change  43%    Lung volume  SVC           2.95 L  80%  RV              1.77 L 134%  TLC            4.71  L 96%  RV/TLC      37 %    DLCO           79 %    Test interpretation    Spirometry is normal with no significant response to  bronchodilator  Lung volumes shows air trapping  Diffusion capacity is normal  Patient has significant response to bronchodilator at mid airflow  FEF 25 to 75% consider methacholine challenge test evaluate for  asthma.        Chest Radiograph     XR chest 2 views 05/12/2024 - Impression - Stable exam with no acute radiographic abnormality.    Chest CT Scan     No testing done       Echocardiogram     No testing done          REVIEW OF SYSTEMS     REVIEW OF SYSTEMS  Review of Systems   Respiratory:  Positive for cough, shortness of breath and wheezing.          PHYSICAL EXAM     VITAL SIGNS: LMP 01/31/2025 (Exact Date)      CURRENT WEIGHT: [unfilled]  BMI: [unfilled]  PREVIOUS WEIGHTS:  Wt Readings from Last 3 Encounters:   04/07/25 85.6 kg (188 lb 12.8 oz)   10/23/24 84.8 kg (187 lb)   09/12/24 83.9 kg (185 lb)       Physical Exam  Constitutional:       Appearance: Normal appearance.   HENT:      Head: Normocephalic and atraumatic.      Right Ear: External ear normal.      Left Ear: External ear normal.      Nose: Nose normal.      Mouth/Throat:      Mouth: Mucous membranes are moist.      Pharynx: Oropharynx is clear.   Eyes:      Extraocular Movements: Extraocular movements intact.      Conjunctiva/sclera: Conjunctivae normal.      Pupils: Pupils are equal, round, and reactive to light.   Cardiovascular:      Rate and Rhythm: Normal rate and regular rhythm.      Pulses: Normal pulses.      Heart sounds: Normal heart sounds.   Pulmonary:      Effort: Pulmonary effort is normal.      Breath sounds: Normal breath sounds.   Abdominal:      General: Bowel sounds are normal.      Palpations: Abdomen is soft.    Musculoskeletal:         General: Normal range of motion.      Cervical back: Normal range of motion and neck supple.   Skin:     General: Skin is warm and dry.   Neurological:      General: No focal deficit present.      Mental Status: She is alert and oriented to person, place, and time. Mental status is at baseline.   Psychiatric:         Mood and Affect: Mood normal.         Behavior: Behavior normal.         Thought Content: Thought content normal.         Judgment: Judgment normal.         ASSESSMENT/PLAN     Ms. Cortes is a 30 y.o. female and  has a past medical history of Asthma, Baker's cyst (N/a), Endometriosis, and Headache. She was referred to the [facility] for evaluation of asthma affecting pregnancy     Problem List and Orders      Assessment and Plan / Recommendations:  Problem List Items Addressed This Visit       Asthma affecting pregnancy in first trimester ([facility]) - Primary    Overview   - uses Breztri inhaler BID (combination inhaler including budesonide, glycopyrrolate, and formoterol)  - Has rescue inhaler but rarely uses it                   Dyspnea 2/2 asthma   Previous PFT in 2023 showing normal Spirometry, +BD response, lung volumes shows air trapping  Diffusion capacity is normal  - repeat Obtain pulmonary function test, FENO and 6 minute walk test  - albuterol hfa 2 puffs or albuterol nebs every 4-6 hours as needed      - Eosinophils 4/2024 - 880, repeat CBC with diff and RAST and Ig E  - patient has been on Breztri x 1 year continue, cont 2 puffs twice a day and rinse and spit - prescription is reporting needs prior auth but has been on for 1 year   - cont singulair      Hypophayrnx - had previous sleep study that was negative for IRVIN     Thank you for visiting the Pulmonary clinic today!       Return to clinic after 4 weeks and after PFTs  or sooner if needed   Yanni Chacon  CNP  My office number is [phone] -     Call to schedule  for  radiology - CT scans/PFTs etc at  [phone]  General scheduling  [phone]     Best way to get a hold of me is to call my office --> Please do not send me follow my health messages  Any test results will be discussed at next visit -- please make sure to make a follow up appt after testing.           [1]   Allergies  Allergen Reactions    Ibuprofen Rash, Shortness of breath, Unknown, GI Upset and Other     abd pain  vaginal bleeding    Acetaminophen Unknown    Acetaminophen-Caff-Pyrilamine Hives     midol    Acetaminophen-Pamabrom Hives and Itching    Midol (Acetaminoph-Pyrilamine) Unknown    Pamabrom Other   [2]   Current Outpatient Medications   Medication Sig Dispense Refill    ondansetron (Zofran) 4 mg tablet Take 1 tablet (4 mg) by mouth every 8 hours if needed for nausea or vomiting. 30 tablet 3    Prenatal Vitamin Plus Low Iron 27 mg iron- 1 mg tablet       prenatal vitamin, iron-folic, 27 mg iron-800 mcg folic acid tablet Take 1 tablet by mouth once daily. 30 tablet 10    albuterol 90 mcg/actuation inhaler Inhale 2 puffs every 4 hours if needed for wheezing. 18 g 0     No current facility-administered medications for this visit.   [3]   Past Surgical History:  Procedure Laterality Date    OTHER SURGICAL HISTORY  09/11/2020    Tonsillectomy    OTHER SURGICAL HISTORY  09/11/2020    Laparoscopy    TONSILLECTOMY     [4]   Family History  Problem Relation Name Age of Onset    Asthma Mother Danuta jackman     Heart failure Mother Danuta jackman     Diabetes Maternal Grandfather Jose Miguel clark     Stroke Maternal Grandfather Jose Miguel clark     Diabetes Paternal Grandfather Rush chase     Cancer Paternal Grandmother Evelyn soria     Thyroid disease Paternal Grandmother Evelyn soria     Seizures Brother Gabe

## 2025-05-05 ENCOUNTER — HOSPITAL ENCOUNTER (OUTPATIENT)
Dept: RADIOLOGY | Facility: CLINIC | Age: 30
Discharge: HOME | End: 2025-05-05
Payer: COMMERCIAL

## 2025-05-05 DIAGNOSIS — Z34.90 PRENATAL CARE, ANTEPARTUM, UNSPECIFIED GRAVIDITY: ICD-10-CM

## 2025-05-05 PROCEDURE — 76801 OB US < 14 WKS SINGLE FETUS: CPT

## 2025-05-05 PROCEDURE — 76801 OB US < 14 WKS SINGLE FETUS: CPT | Performed by: OBSTETRICS & GYNECOLOGY

## 2025-05-06 ENCOUNTER — APPOINTMENT (OUTPATIENT)
Facility: CLINIC | Age: 30
End: 2025-05-06
Payer: COMMERCIAL

## 2025-05-06 VITALS
DIASTOLIC BLOOD PRESSURE: 74 MMHG | HEIGHT: 63 IN | BODY MASS INDEX: 33.1 KG/M2 | TEMPERATURE: 97.9 F | HEART RATE: 98 BPM | RESPIRATION RATE: 20 BRPM | WEIGHT: 186.8 LBS | OXYGEN SATURATION: 96 % | SYSTOLIC BLOOD PRESSURE: 116 MMHG

## 2025-05-06 DIAGNOSIS — J06.9 UPPER RESPIRATORY TRACT INFECTION, UNSPECIFIED TYPE: ICD-10-CM

## 2025-05-06 DIAGNOSIS — J45.909 ASTHMA AFFECTING PREGNANCY IN FIRST TRIMESTER (HHS-HCC): Primary | ICD-10-CM

## 2025-05-06 DIAGNOSIS — O99.511 ASTHMA AFFECTING PREGNANCY IN FIRST TRIMESTER (HHS-HCC): Primary | ICD-10-CM

## 2025-05-06 DIAGNOSIS — J40 BRONCHITIS: ICD-10-CM

## 2025-05-06 PROCEDURE — 99204 OFFICE O/P NEW MOD 45 MIN: CPT | Performed by: NURSE PRACTITIONER

## 2025-05-06 PROCEDURE — 3008F BODY MASS INDEX DOCD: CPT | Performed by: NURSE PRACTITIONER

## 2025-05-06 PROCEDURE — 1036F TOBACCO NON-USER: CPT | Performed by: NURSE PRACTITIONER

## 2025-05-06 RX ORDER — ALBUTEROL SULFATE 90 UG/1
1 INHALANT RESPIRATORY (INHALATION) ONCE
OUTPATIENT
Start: 2025-05-06

## 2025-05-06 RX ORDER — BUDESONIDE, GLYCOPYRROLATE, AND FORMOTEROL FUMARATE 160; 9; 4.8 UG/1; UG/1; UG/1
2 AEROSOL, METERED RESPIRATORY (INHALATION) 2 TIMES DAILY
COMMUNITY
End: 2025-05-07 | Stop reason: WASHOUT

## 2025-05-06 RX ORDER — BUDESONIDE, GLYCOPYRROLATE, AND FORMOTEROL FUMARATE 160; 9; 4.8 UG/1; UG/1; UG/1
2 AEROSOL, METERED RESPIRATORY (INHALATION)
Qty: 24 G | Refills: 3 | Status: SHIPPED | OUTPATIENT
Start: 2025-05-06 | End: 2025-09-03

## 2025-05-06 RX ORDER — ALBUTEROL SULFATE 0.83 MG/ML
3 SOLUTION RESPIRATORY (INHALATION) ONCE
OUTPATIENT
Start: 2025-05-06 | End: 2025-05-06

## 2025-05-06 RX ORDER — ALBUTEROL SULFATE 90 UG/1
2 INHALANT RESPIRATORY (INHALATION) EVERY 4 HOURS PRN
Qty: 18 G | Refills: 0 | Status: SHIPPED | OUTPATIENT
Start: 2025-05-06 | End: 2025-06-05

## 2025-05-06 ASSESSMENT — COLUMBIA-SUICIDE SEVERITY RATING SCALE - C-SSRS
2. HAVE YOU ACTUALLY HAD ANY THOUGHTS OF KILLING YOURSELF?: NO
6. HAVE YOU EVER DONE ANYTHING, STARTED TO DO ANYTHING, OR PREPARED TO DO ANYTHING TO END YOUR LIFE?: NO
1. IN THE PAST MONTH, HAVE YOU WISHED YOU WERE DEAD OR WISHED YOU COULD GO TO SLEEP AND NOT WAKE UP?: NO

## 2025-05-06 ASSESSMENT — ENCOUNTER SYMPTOMS
COUGH: 1
SHORTNESS OF BREATH: 1
WHEEZING: 1

## 2025-05-06 ASSESSMENT — ASTHMA QUESTIONNAIRES
QUESTION_3 LAST FOUR WEEKS HOW OFTEN DID YOUR ASTHMA SYMPTOMS (WHEEZING, COUGHING, SHORTNESS OF BREATH, CHEST TIGHTNESS OR PAIN) WAKE YOU UP AT NIGHT OR EARLIER THAN USUAL IN THE MORNING: 2-3 NIGHTS A WEEK
QUESTION_4 LAST FOUR WEEKS HOW OFTEN HAVE YOU USED YOUR RESCUE INHALER OR NEBULIZER MEDICATION (SUCH AS ALBUTEROL): 3 OR MORE TIMES PER DAY
ACT_TOTALSCORE: 10
QUESTION_5 LAST FOUR WEEKS HOW WOULD YOU RATE YOUR ASTHMA CONTROL: POORLY CONTROLLED
QUESTION_2 LAST FOUR WEEKS HOW OFTEN HAVE YOU HAD SHORTNESS OF BREATH: ONCE A DAY
QUESTION_1 LAST FOUR WEEKS HOW MUCH OF THE TIME DID YOUR ASTHMA KEEP YOU FROM GETTING AS MUCH DONE AT WORK, SCHOOL OR AT HOME: SOME OF THE TIME

## 2025-05-06 ASSESSMENT — PATIENT HEALTH QUESTIONNAIRE - PHQ9
1. LITTLE INTEREST OR PLEASURE IN DOING THINGS: NOT AT ALL
SUM OF ALL RESPONSES TO PHQ9 QUESTIONS 1 AND 2: 0
2. FEELING DOWN, DEPRESSED OR HOPELESS: NOT AT ALL

## 2025-05-06 NOTE — PATIENT INSTRUCTIONS
Dyspnea 2/2 asthma   Previous PFT in 2023 showing normal Spirometry, +BD response, lung volumes shows air trapping  Diffusion capacity is normal  - repeat Obtain pulmonary function test, FENO and 6 minute walk test  - albuterol hfa 2 puffs or albuterol nebs every 4-6 hours as needed      - Eosinophils 4/2024 - 880, repeat CBC with diff and RAST and Ig E  - patient has been on Breztri x 1 year continue, cont 2 puffs twice a day and rinse and spit - prescription is reporting needs prior auth but has been on for 1 year   - cont singulair     Thank you for visiting the Pulmonary clinic today!       Return to clinic after 4 weeks and after PFTs  or sooner if needed   Yanni Chacon CNP  My office number is [phone] -     Call to schedule  for radiology - CT scans/PFTs etc at  [phone]  General scheduling  [phone]     Best way to get a hold of me is to call my office --> Please do not send me follow my health messages  Any test results will be discussed at next visit -- please make sure to make a follow up appt after testing.

## 2025-05-07 ENCOUNTER — APPOINTMENT (OUTPATIENT)
Dept: LAB | Facility: HOSPITAL | Age: 30
End: 2025-05-07
Payer: COMMERCIAL

## 2025-05-07 ENCOUNTER — APPOINTMENT (OUTPATIENT)
Dept: OBSTETRICS AND GYNECOLOGY | Facility: CLINIC | Age: 30
End: 2025-05-07
Payer: COMMERCIAL

## 2025-05-07 VITALS — DIASTOLIC BLOOD PRESSURE: 68 MMHG | BODY MASS INDEX: 32.69 KG/M2 | WEIGHT: 186 LBS | SYSTOLIC BLOOD PRESSURE: 122 MMHG

## 2025-05-07 DIAGNOSIS — O99.511 ASTHMA AFFECTING PREGNANCY IN FIRST TRIMESTER (HHS-HCC): ICD-10-CM

## 2025-05-07 DIAGNOSIS — Z34.91 ENCOUNTER FOR SUPERVISION OF NORMAL PREGNANCY, UNSPECIFIED, FIRST TRIMESTER: ICD-10-CM

## 2025-05-07 DIAGNOSIS — J45.909 ASTHMA AFFECTING PREGNANCY IN FIRST TRIMESTER (HHS-HCC): ICD-10-CM

## 2025-05-07 DIAGNOSIS — Z3A.13 13 WEEKS GESTATION OF PREGNANCY (HHS-HCC): Primary | ICD-10-CM

## 2025-05-07 DIAGNOSIS — Z34.91 PRENATAL CARE IN FIRST TRIMESTER, UNSPECIFIED GRAVIDITY: ICD-10-CM

## 2025-05-07 DIAGNOSIS — O99.210 OBESITY IN PREGNANCY (HHS-HCC): ICD-10-CM

## 2025-05-07 DIAGNOSIS — Z87.51 HISTORY OF PRETERM DELIVERY: ICD-10-CM

## 2025-05-07 LAB
ABO GROUP (TYPE) IN BLOOD: NORMAL
ANTIBODY SCREEN: NORMAL
ERYTHROCYTE [DISTWIDTH] IN BLOOD BY AUTOMATED COUNT: 13.9 % (ref 11.5–14.5)
HCT VFR BLD AUTO: 35.4 % (ref 36–46)
HGB BLD-MCNC: 11.8 G/DL (ref 12–16)
MCH RBC QN AUTO: 30.2 PG (ref 26–34)
MCHC RBC AUTO-ENTMCNC: 33.3 G/DL (ref 32–36)
MCV RBC AUTO: 91 FL (ref 80–100)
NRBC BLD-RTO: 0 /100 WBCS (ref 0–0)
PLATELET # BLD AUTO: 291 X10*3/UL (ref 150–450)
RBC # BLD AUTO: 3.91 X10*6/UL (ref 4–5.2)
REFLEX ADDED, ANEMIA PANEL: NORMAL
RH FACTOR (ANTIGEN D): NORMAL
WBC # BLD AUTO: 12 X10*3/UL (ref 4.4–11.3)

## 2025-05-07 PROCEDURE — 83021 HEMOGLOBIN CHROMOTOGRAPHY: CPT

## 2025-05-07 PROCEDURE — 86850 RBC ANTIBODY SCREEN: CPT

## 2025-05-07 PROCEDURE — 81220 CFTR GENE COM VARIANTS: CPT

## 2025-05-07 PROCEDURE — 99213 OFFICE O/P EST LOW 20 MIN: CPT | Performed by: OBSTETRICS & GYNECOLOGY

## 2025-05-07 PROCEDURE — 81329 SMN1 GENE DOS/DELETION ALYS: CPT

## 2025-05-07 PROCEDURE — 83020 HEMOGLOBIN ELECTROPHORESIS: CPT

## 2025-05-07 PROCEDURE — 81243 FMR1 GEN ALY DETC ABNL ALLEL: CPT

## 2025-05-07 PROCEDURE — 85027 COMPLETE CBC AUTOMATED: CPT

## 2025-05-07 PROCEDURE — 87626 HPV SEP HI-RSK TYP&POOL RSLT: CPT

## 2025-05-07 PROCEDURE — 86900 BLOOD TYPING SEROLOGIC ABO: CPT

## 2025-05-07 PROCEDURE — 86901 BLOOD TYPING SEROLOGIC RH(D): CPT

## 2025-05-07 RX ORDER — NAPROXEN SODIUM 220 MG/1
TABLET, FILM COATED ORAL
COMMUNITY
Start: 2025-03-25

## 2025-05-07 NOTE — PROGRESS NOTES
Pap, gc/ct, and all OB labs today.    Routine prenatal visit     Subjective    HPI:  Routine OB follow up.  Still feeling nauseous but taking zofran which helps sometimes.  Discussed recommendation for aspirin prophylaxis - pt declines.  Had normal 13 week USN.  Routine labs, cfDNA screening, carrier screening ordered today and pt getting drawn at  lab. Has 16 week cervical length screening scheduled.     Objective    Vital Signs  /68   Wt 84.4 kg (186 lb)   LMP 2025 (Exact Date)   BMI 32.69 kg/m²     Joey Cortes is a 30 y.o. yo  at 13w5d here for the following concerns which we addressed today:     Medical Problems       Problem List       Nasal obstruction    Nasal polyp    Hypertrophy of both inferior nasal turbinates    DNS (deviated nasal septum)    13 weeks gestation of pregnancy ([facility])    Overview Addendum 2025  1:08 PM by Nusrat Villegas MD   Desired provider in labor: [] CNM  [x] Physician   [] Either Acceptable  [x] Blood Products: [x] Yes, accepts [] No, needs counseling  [x] Initial BMI: 31.40   [] Prenatal Labs: <> sent     [] Cervical Cancer Screening up to date: done today    [x] Rh status: positive   [x] Screen for IPV and Substance Use Risk  [] Genetic Screening (cfDNA): <>wants cfDNA screening and carrier screening - sent today    [x] First Trimester Anatomy Screen (11-13.6 wks): WNL   [x] Baby ASA: Recommended - pt declines   [x] Pregnancy dated by: sure LMP c/w 9 week USN     [] Anatomy US: (19-20 wks)  [] Federal Sterilization consent signed (if indicated):  [] 1hr GCT at 24-28wks:  [] Fetal Surveillance (if indicated):  [] Tdap (27-32 wks, may be given up to 36 wks if initial window missed):   [] RSV (32-36 wks) (Sept. to end of ):     [] Feeding Intentions:  [] Postpartum Birth control method:   [] GBS at 36 - 37 wks:  [] 39 weeks discussion of IOL vs. Expectant management:  [] Mode of delivery ( anticipated ):           Obesity in pregnancy  ([facility])    Overview Signed 2025  1:46 PM by Nusrat Villegas MD   - BMI 32 at first visit          History of  delivery    Overview Addendum 2025  1:09 PM by Nusrat Villegas MD   - PTL and unintentional home delivery at 33.0 in first pregnancy  - term  (37.2) with second pregnancy - did receive 17-OHP during this pregnancy   <> screening cervical length scheduled at 16 weeks          Asthma affecting pregnancy in first trimester ([facility])    Overview Signed 2025  1:50 PM by Nusrat Villegas MD   - uses Breztri inhaler BID (combination inhaler including budesonide, glycopyrrolate, and formoterol)  - Has rescue inhaler but rarely uses it            Carpal tunnel syndrome, right upper limb        Follow up in 4 week(s).

## 2025-05-08 LAB
C TRACH RRNA SPEC QL NAA+PROBE: NOT DETECTED
HOLD SPECIMEN: NORMAL
HOLD SPECIMEN: NORMAL
N GONORRHOEA RRNA SPEC QL NAA+PROBE: NOT DETECTED
QUEST GC CT AMPLIFIED (ALWAYS MESSAGE): NORMAL

## 2025-05-09 LAB
A ALTERNATA IGE QN: <0.1 KU/L
A ALTERNATA IGE RAST: 0
A FUMIGATUS IGE QN: <0.1 KU/L
A FUMIGATUS IGE RAST: 0
BASOPHILS # BLD AUTO: 35 CELLS/UL (ref 0–200)
BASOPHILS NFR BLD AUTO: 0.3 %
BERMUDA GRASS IGE QN: <0.1 KU/L
BERMUDA GRASS IGE RAST: 0
BOXELDER IGE QN: <0.1 KU/L
BOXELDER IGE RAST: 0
C HERBARUM IGE QN: <0.1 KU/L
C HERBARUM IGE RAST: 0
CALIF WALNUT POLN IGE QN: <0.1 KU/L
CALIF WALNUT POLN IGE RAST: 0
CAT DANDER IGE QN: <0.1 KU/L
CAT DANDER IGE RAST: 0
CMN PIGWEED IGE QN: <0.1 KU/L
CMN PIGWEED IGE RAST: 0
COMMON RAGWEED IGE QN: <0.1 KU/L
COMMON RAGWEED IGE RAST: 0
COTTONWOOD IGE QN: <0.1 KU/L
COTTONWOOD IGE RAST: 0
D FARINAE IGE QN: <0.1 KU/L
D FARINAE IGE RAST: 0
D PTERONYSS IGE QN: <0.1 KU/L
D PTERONYSS IGE RAST: 0
DOG DANDER IGE QN: <0.1 KU/L
DOG DANDER IGE RAST: 0
EOSINOPHIL # BLD AUTO: 460 CELLS/UL (ref 15–500)
EOSINOPHIL NFR BLD AUTO: 4 %
ERYTHROCYTE [DISTWIDTH] IN BLOOD BY AUTOMATED COUNT: 13.4 % (ref 11–15)
HCT VFR BLD AUTO: 35.3 % (ref 35–45)
HEMOGLOBIN A2: 3 % (ref 2–3.5)
HEMOGLOBIN A: 96.6 % (ref 95.8–98)
HEMOGLOBIN F: 0.4 % (ref 0–2)
HEMOGLOBIN IDENTIFICATION INTERPRETATION: NORMAL
HGB BLD-MCNC: 11.7 G/DL (ref 11.7–15.5)
IGE SERPL-ACNC: 17 KU/L
LONDON PLANE IGE QN: <0.1 KU/L
LONDON PLANE IGE RAST: 0
LYMPHOCYTES # BLD AUTO: 2622 CELLS/UL (ref 850–3900)
LYMPHOCYTES NFR BLD AUTO: 22.8 %
MCH RBC QN AUTO: 30.2 PG (ref 27–33)
MCHC RBC AUTO-ENTMCNC: 33.1 G/DL (ref 32–36)
MCV RBC AUTO: 91.2 FL (ref 80–100)
MONOCYTES # BLD AUTO: 840 CELLS/UL (ref 200–950)
MONOCYTES NFR BLD AUTO: 7.3 %
MOUSE URINE PROT IGE QN: <0.1 KU/L
MOUSE URINE PROT IGE RAST: 0
MT JUNIPER IGE QN: <0.1 KU/L
MT JUNIPER IGE RAST: 0
NEUTROPHILS # BLD AUTO: 7544 CELLS/UL (ref 1500–7800)
NEUTROPHILS NFR BLD AUTO: 65.6 %
P NOTATUM IGE QN: <0.1 KU/L
P NOTATUM IGE RAST: 0
PATH REVIEW-HGB IDENTIFICATION: NORMAL
PECAN/HICK TREE IGE QN: <0.1 KU/L
PECAN/HICK TREE IGE RAST: 0
PLATELET # BLD AUTO: 297 THOUSAND/UL (ref 140–400)
PMV BLD REES-ECKER: 10.8 FL (ref 7.5–12.5)
RBC # BLD AUTO: 3.87 MILLION/UL (ref 3.8–5.1)
REF LAB TEST REF RANGE: NORMAL
ROACH IGE QN: <0.1 KU/L
ROACH IGE RAST: 0
SALTWORT IGE QN: <0.1 KU/L
SALTWORT IGE RAST: 0
SHEEP SORREL IGE QN: <0.1 KU/L
SHEEP SORREL IGE RAST: 0
SILVER BIRCH IGE QN: <0.1 KU/L
SILVER BIRCH IGE RAST: 0
TIMOTHY IGE QN: <0.1 KU/L
TIMOTHY IGE RAST: 0
WBC # BLD AUTO: 11.5 THOUSAND/UL (ref 3.8–10.8)
WHITE ASH IGE QN: <0.1 KU/L
WHITE ASH IGE RAST: 0
WHITE ELM IGE QN: <0.1 KU/L
WHITE ELM IGE RAST: 0
WHITE MULBERRY IGE QN: <0.1 KU/L
WHITE MULBERRY IGE RAST: 0
WHITE OAK IGE QN: <0.1 KU/L
WHITE OAK IGE RAST: 0

## 2025-05-11 LAB
EST. AVERAGE GLUCOSE BLD GHB EST-MCNC: 114 MG/DL
EST. AVERAGE GLUCOSE BLD GHB EST-SCNC: 6.3 MMOL/L
HBA1C MFR BLD: 5.6 %
HBV SURFACE AG SERPL QL IA: NORMAL
HCV AB SERPL QL IA: NORMAL
HIV 1+2 AB+HIV1 P24 AG SERPL QL IA: NORMAL
RUBV IGG SERPL IA-ACNC: 8.97 INDEX
T PALLIDUM AB SER QL IA: NEGATIVE

## 2025-05-13 LAB
ELECTRONICALLY SIGNED BY: NORMAL
FRAGILE X INTERPRETATION: NORMAL
FRAGILE X RESULT: NORMAL

## 2025-05-14 LAB
CFTR MUT ANL BLD/T: NORMAL
ELECTRONICALLY SIGNED BY: NORMAL
ELECTRONICALLY SIGNED BY: NORMAL
SMA RESULT: NORMAL

## 2025-05-16 LAB
CYTOLOGY CMNT CVX/VAG CYTO-IMP: NORMAL
HPV HR 12 DNA GENITAL QL NAA+PROBE: NEGATIVE
HPV HR GENOTYPES PNL CVX NAA+PROBE: NEGATIVE
HPV16 DNA SPEC QL NAA+PROBE: NEGATIVE
HPV18 DNA SPEC QL NAA+PROBE: NEGATIVE
LAB AP HPV GENOTYPE QUESTION: YES
LAB AP HPV HR: NORMAL
LABORATORY COMMENT REPORT: NORMAL
PATH REPORT.TOTAL CANCER: NORMAL

## 2025-05-23 ENCOUNTER — TELEPHONE (OUTPATIENT)
Dept: OBSTETRICS AND GYNECOLOGY | Facility: CLINIC | Age: 30
End: 2025-05-23
Payer: COMMERCIAL

## 2025-05-23 NOTE — TELEPHONE ENCOUNTER
Contact patient   Verified patient name and date of birth  Pt informed of  message    MD Kayla Michele  Can you let her know her cfDNA screening was risk reducing? Thanks!    Contact patient   Verified patient name and date of birth

## 2025-05-23 NOTE — TELEPHONE ENCOUNTER
----- Message from Nusrat Villegas sent at 5/23/2025  1:05 PM EDT -----  Regarding: cfDNA screening  Can you let her know her cfDNA screening was risk reducing? Thanks!  ----- Message -----  From: Darnell, Onbase - Scan In  Sent: 5/21/2025   8:49 PM EDT  To: Nusrat Villegas MD

## 2025-05-27 ENCOUNTER — HOSPITAL ENCOUNTER (OUTPATIENT)
Dept: RADIOLOGY | Facility: CLINIC | Age: 30
Discharge: HOME | End: 2025-05-27
Payer: COMMERCIAL

## 2025-05-27 DIAGNOSIS — Z34.90 PRENATAL CARE, ANTEPARTUM, UNSPECIFIED GRAVIDITY: ICD-10-CM

## 2025-05-27 PROCEDURE — 76817 TRANSVAGINAL US OBSTETRIC: CPT | Performed by: OBSTETRICS & GYNECOLOGY

## 2025-05-27 PROCEDURE — 76817 TRANSVAGINAL US OBSTETRIC: CPT

## 2025-05-27 PROCEDURE — 76815 OB US LIMITED FETUS(S): CPT | Performed by: OBSTETRICS & GYNECOLOGY

## 2025-05-27 PROCEDURE — 76815 OB US LIMITED FETUS(S): CPT

## 2025-05-28 LAB — COMMENTS - MP RESULT TYPE: NORMAL

## 2025-05-30 DIAGNOSIS — O21.9 NAUSEA AND VOMITING IN PREGNANCY PRIOR TO 22 WEEKS GESTATION: Primary | ICD-10-CM

## 2025-05-30 DIAGNOSIS — K21.9 GASTROESOPHAGEAL REFLUX DISEASE WITHOUT ESOPHAGITIS: Primary | ICD-10-CM

## 2025-05-30 RX ORDER — FAMOTIDINE 20 MG/1
20 TABLET, FILM COATED ORAL 2 TIMES DAILY PRN
Qty: 60 TABLET | Refills: 5 | Status: SHIPPED | OUTPATIENT
Start: 2025-05-30 | End: 2026-05-30

## 2025-05-30 RX ORDER — METOCLOPRAMIDE 10 MG/1
10 TABLET ORAL 3 TIMES DAILY PRN
Qty: 30 TABLET | Refills: 2 | Status: SHIPPED | OUTPATIENT
Start: 2025-05-30 | End: 2025-06-29

## 2025-06-01 ENCOUNTER — PATIENT MESSAGE (OUTPATIENT)
Facility: CLINIC | Age: 30
End: 2025-06-01
Payer: COMMERCIAL

## 2025-06-04 ENCOUNTER — APPOINTMENT (OUTPATIENT)
Dept: OBSTETRICS AND GYNECOLOGY | Facility: CLINIC | Age: 30
End: 2025-06-04
Payer: COMMERCIAL

## 2025-06-04 VITALS — BODY MASS INDEX: 32.86 KG/M2 | WEIGHT: 187 LBS | DIASTOLIC BLOOD PRESSURE: 76 MMHG | SYSTOLIC BLOOD PRESSURE: 124 MMHG

## 2025-06-04 DIAGNOSIS — O99.511 ASTHMA AFFECTING PREGNANCY IN FIRST TRIMESTER (HHS-HCC): ICD-10-CM

## 2025-06-04 DIAGNOSIS — O99.210 OBESITY IN PREGNANCY (HHS-HCC): ICD-10-CM

## 2025-06-04 DIAGNOSIS — Z3A.17 17 WEEKS GESTATION OF PREGNANCY (HHS-HCC): Primary | ICD-10-CM

## 2025-06-04 DIAGNOSIS — J45.909 ASTHMA AFFECTING PREGNANCY IN FIRST TRIMESTER (HHS-HCC): ICD-10-CM

## 2025-06-04 DIAGNOSIS — Z87.51 HISTORY OF PRETERM DELIVERY: ICD-10-CM

## 2025-06-04 PROCEDURE — 99213 OFFICE O/P EST LOW 20 MIN: CPT | Performed by: OBSTETRICS & GYNECOLOGY

## 2025-06-04 RX ORDER — MONTELUKAST SODIUM 10 MG/1
10 TABLET ORAL NIGHTLY
COMMUNITY
Start: 2025-05-21

## 2025-06-04 NOTE — PROGRESS NOTES
Routine prenatal visit     Subjective    HPI:  Still feeling nauseous but doing OK.  Taking pepcid which is helping.  Cervical lengths have been normal so far.  Has another Usn scheduled.  Reviewed labs.  Had risk-reducing cfDNA screening.  Having a GIRL.  Has a follow up with pulmonology on  for her asthma.     Objective    Vital Signs  /76   Wt 84.8 kg (187 lb)   LMP 2025 (Exact Date)   BMI 32.86 kg/m²     Joey Cortes is a 30 y.o. yo  at 17w5d here for the following concerns which we addressed today:     Medical Problems       Problem List       Nasal obstruction    Nasal polyp    Hypertrophy of both inferior nasal turbinates    DNS (deviated nasal septum)    17 weeks gestation of pregnancy ([facility])    Overview Addendum 2025  2:41 PM by Nusrat Villegas MD   Desired provider in labor: [] CNM  [x] Physician   [] Either Acceptable  [x] Blood Products: [x] Yes, accepts [] No, needs counseling  [x] Initial BMI: 31.40   [x] Prenatal Labs: UTD  [x] Cervical Cancer Screening up to date: normal, negative HPV    [x] Rh status: positive   [x] Screen for IPV and Substance Use Risk  [x] Genetic Screening (cfDNA): rr cfDNA screening, it's a GIRL   [x] First Trimester Anatomy Screen (11-13.6 wks): WNL   [x] Baby ASA: Recommended - pt declines   [x] Pregnancy dated by: sure LMP c/w 9 week USN     [] Anatomy US: (19-20 wks)  [] Federal Sterilization consent signed (if indicated):  [] 1hr GCT at 24-28wks:  [] Fetal Surveillance (if indicated):  [] Tdap (27-32 wks, may be given up to 36 wks if initial window missed):   [] RSV (32-36 wks) (Sept. to end of ):     [] Feeding Intentions:  [] Postpartum Birth control method:   [] GBS at 36 - 37 wks:  [] 39 weeks discussion of IOL vs. Expectant management:  [] Mode of delivery ( anticipated ):           Obesity in pregnancy ([facility])    Overview Signed 2025  1:46 PM by Nusrat Villegas MD   - BMI 32 at first visit           History of  delivery    Overview Addendum 2025  2:41 PM by Nusrat Villegas MD   - PTL and unintentional home delivery at 33.0 in first pregnancy  - term  (37.2) with second pregnancy - did receive 17-OHP during this pregnancy   - normal CL to date - has another one scheduled          Asthma affecting pregnancy in first trimester ([facility])    Overview Addendum 2025  2:40 PM by Nusrat Villegas MD   - uses Breztri inhaler BID (combination inhaler including budesonide, glycopyrrolate, and formoterol)  - Has rescue inhaler but rarely uses it     <> has follow up appt with pulm             Carpal tunnel syndrome, right upper limb        Follow up in 4 week(s).

## 2025-06-06 DIAGNOSIS — J40 BRONCHITIS: ICD-10-CM

## 2025-06-06 DIAGNOSIS — J06.9 UPPER RESPIRATORY TRACT INFECTION, UNSPECIFIED TYPE: ICD-10-CM

## 2025-06-09 RX ORDER — ALBUTEROL SULFATE 90 UG/1
2 INHALANT RESPIRATORY (INHALATION) EVERY 4 HOURS PRN
Qty: 18 G | Refills: 11 | Status: SHIPPED | OUTPATIENT
Start: 2025-06-09

## 2025-06-10 ENCOUNTER — HOSPITAL ENCOUNTER (OUTPATIENT)
Dept: RADIOLOGY | Facility: CLINIC | Age: 30
Discharge: HOME | End: 2025-06-10
Payer: COMMERCIAL

## 2025-06-10 DIAGNOSIS — Z34.90 PRENATAL CARE, ANTEPARTUM, UNSPECIFIED GRAVIDITY: ICD-10-CM

## 2025-06-10 PROCEDURE — 76817 TRANSVAGINAL US OBSTETRIC: CPT

## 2025-06-10 PROCEDURE — 76815 OB US LIMITED FETUS(S): CPT | Performed by: STUDENT IN AN ORGANIZED HEALTH CARE EDUCATION/TRAINING PROGRAM

## 2025-06-10 PROCEDURE — 76817 TRANSVAGINAL US OBSTETRIC: CPT | Performed by: STUDENT IN AN ORGANIZED HEALTH CARE EDUCATION/TRAINING PROGRAM

## 2025-06-10 PROCEDURE — 76815 OB US LIMITED FETUS(S): CPT

## 2025-06-11 ENCOUNTER — APPOINTMENT (OUTPATIENT)
Facility: CLINIC | Age: 30
End: 2025-06-11
Payer: COMMERCIAL

## 2025-06-19 ENCOUNTER — HOSPITAL ENCOUNTER (OUTPATIENT)
Dept: RESPIRATORY THERAPY | Facility: HOSPITAL | Age: 30
Discharge: HOME | End: 2025-06-19
Payer: COMMERCIAL

## 2025-06-19 DIAGNOSIS — J40 BRONCHITIS: ICD-10-CM

## 2025-06-19 DIAGNOSIS — J06.9 UPPER RESPIRATORY TRACT INFECTION, UNSPECIFIED TYPE: ICD-10-CM

## 2025-06-19 DIAGNOSIS — J45.909 ASTHMA AFFECTING PREGNANCY IN FIRST TRIMESTER (HHS-HCC): ICD-10-CM

## 2025-06-19 DIAGNOSIS — O99.511 ASTHMA AFFECTING PREGNANCY IN FIRST TRIMESTER (HHS-HCC): ICD-10-CM

## 2025-06-19 LAB
MGC ASCENT PFT - FEV1 - POST: 2.92
MGC ASCENT PFT - FEV1 - POST: 2.92
MGC ASCENT PFT - FEV1 - PRE: 2.75
MGC ASCENT PFT - FEV1 - PRE: 2.75
MGC ASCENT PFT - FEV1 - PREDICTED: 2.9
MGC ASCENT PFT - FEV1 - PREDICTED: 2.9
MGC ASCENT PFT - FVC - POST: 3.26
MGC ASCENT PFT - FVC - POST: 3.26
MGC ASCENT PFT - FVC - PRE: 3.23
MGC ASCENT PFT - FVC - PRE: 3.23
MGC ASCENT PFT - FVC - PREDICTED: 3.39
MGC ASCENT PFT - FVC - PREDICTED: 3.39

## 2025-06-19 PROCEDURE — 2500000002 HC RX 250 W HCPCS SELF ADMINISTERED DRUGS (ALT 637 FOR MEDICARE OP, ALT 636 FOR OP/ED): Performed by: NURSE PRACTITIONER

## 2025-06-19 PROCEDURE — 95012 NITRIC OXIDE EXP GAS DETER: CPT

## 2025-06-19 PROCEDURE — 94726 PLETHYSMOGRAPHY LUNG VOLUMES: CPT

## 2025-06-19 PROCEDURE — 94640 AIRWAY INHALATION TREATMENT: CPT

## 2025-06-19 PROCEDURE — 94618 PULMONARY STRESS TESTING: CPT

## 2025-06-19 RX ORDER — ALBUTEROL SULFATE 0.83 MG/ML
3 SOLUTION RESPIRATORY (INHALATION) ONCE
Status: COMPLETED | OUTPATIENT
Start: 2025-06-19 | End: 2025-06-19

## 2025-06-19 RX ORDER — ALBUTEROL SULFATE 90 UG/1
1 INHALANT RESPIRATORY (INHALATION) ONCE
Status: COMPLETED | OUTPATIENT
Start: 2025-06-19 | End: 2025-06-19

## 2025-06-19 RX ADMIN — ALBUTEROL SULFATE 3 ML: 2.5 SOLUTION RESPIRATORY (INHALATION) at 12:27

## 2025-06-24 ENCOUNTER — HOSPITAL ENCOUNTER (OUTPATIENT)
Dept: RADIOLOGY | Facility: CLINIC | Age: 30
Discharge: HOME | End: 2025-06-24
Payer: COMMERCIAL

## 2025-06-24 DIAGNOSIS — Z34.90 PRENATAL CARE, ANTEPARTUM, UNSPECIFIED GRAVIDITY: ICD-10-CM

## 2025-06-24 PROCEDURE — 76811 OB US DETAILED SNGL FETUS: CPT

## 2025-06-24 PROCEDURE — 76817 TRANSVAGINAL US OBSTETRIC: CPT | Performed by: STUDENT IN AN ORGANIZED HEALTH CARE EDUCATION/TRAINING PROGRAM

## 2025-06-24 PROCEDURE — 76817 TRANSVAGINAL US OBSTETRIC: CPT

## 2025-06-24 PROCEDURE — 76811 OB US DETAILED SNGL FETUS: CPT | Performed by: STUDENT IN AN ORGANIZED HEALTH CARE EDUCATION/TRAINING PROGRAM

## 2025-06-26 ENCOUNTER — TELEPHONE (OUTPATIENT)
Facility: CLINIC | Age: 30
End: 2025-06-26
Payer: COMMERCIAL

## 2025-06-26 NOTE — TELEPHONE ENCOUNTER
LMOM for patient.     Patient is scheduled with Yanni Chacon CNP 7/22/25.     Provider will be out office.     Patient will need to be rescheduled.     Office phone number given on VM.

## 2025-06-30 ASSESSMENT — ENCOUNTER SYMPTOMS
SHORTNESS OF BREATH: 1
WHEEZING: 1

## 2025-06-30 NOTE — PROGRESS NOTES
Patient: Joey Cortes    [id number]  : 1995 -- AGE 30 y.o.    Provider: MARIAH Alanis     Location [facility]   Service Date: 2025              [facility]  New Visit Note      HISTORY OF PRESENT ILLNESS     The patient's referring provider is: No ref. provider found    HISTORY OF PRESENT ILLNESS   Joey Cortes is a 30 y.o. female who presents to a [facility] for an evaluation with concerns of No chief complaint on file.. I have independently interviewed and examined the patient in the office and reviewed available records.    Current History 2025    On today's visit, the patient reports she is pregnant 14 weeks pregnant, she is having wheezing and shortness of breath.   She is on singulair and albuterol nebs and breztri  Diagnosed with asthma in  -  She is a former smoker 10 years x 1 ppd   Did not have respiratory concerns with previous 2 pregnancies.   She had an inhaler in high school to use during exercise       At baseline,  has dyspnea on exertion and at rest, especially if she is warm. She tried using her inahler but the solution is not aerolizing.  Currently sits for most of the day, works inside the house, but does not carry loads and do strenuous exercise. Has to stop for breath after walking about 100 meters or a few minutes (mMRC 3). Denies orthopnea, pnd, or precious.   Weight has been mostly stable.  Relates dry chronic cough mostly at night, c/o wheezing, and denies green, blood streaks sputum. C/o night cough. No hemoptysis. No fever or shivering chills. Has a runny nose, and a tingling sensation in the back of his throat.  Also complains of heartburn. Denies chest pain    Previous pulmonary history:   previously was told they have asthma and episode of bronchitis and PNA not on O2     Inhalers/nebulized medications: albuterol and singular      Hospitalization History: Not been hospitalized over the last year for breathing related problem.    Sleep history:  Denies snoring, apnea, feeling tired during the day or taking naps during the day.       7/7/2025  On today's visit, the patient reports feeling lightheaded with heat and humidity, trying to stay indoors. She has 3 children playing baseball outside. She is winded after a couple minutes MMRC 3   She is 22 weeks pregnant.  Denies cough, occ wheezing.  Using rescue inhaler 1 time per week. She is taking her pulse ox - at worst at 80-75% Has known nasal polyps - sees Dr John   Denies chest tightness.   Awakens 1-2 nights and do a nebulizer,.   ACT score is 11     ALLERGIES AND MEDICATIONS     ALLERGIES  Allergies[1]    MEDICATIONS  Current Medications[2]      PAST HISTORY     PAST MEDICAL HISTORY  She  has a past medical history of Asthma, Baker's cyst (N/a), Endometriosis, and Headache.    PAST SURGICAL HISTORY  Surgical History[3]    IMMUNIZATION HISTORY  Immunization History   Administered Date(s) Administered    Moderna SARS-CoV-2 Vaccination 12/30/2022       SOCIAL HISTORY  She  reports that she quit smoking about 4 years ago. Her smoking use included cigarettes. She has never used smokeless tobacco. She reports that she does not drink alcohol and does not use drugs. She Patient   She is a former smoker 10 years x 1 ppd quit 4 yrs ago    OCCUPATIONAL/ENVIRONMENTAL HISTORY  Currently works as: [employer]   DOES/DOES NOT EC: does not have known exposure to asbestos, silica, beryllium or inhaled metals.  DOES/DOES NOT EC: does not have exposure to birds or exotic animals.    FAMILY HISTORY  Family History[4]  DOES/DOES NOT EC: does have a family history of pulmonary disease. Grandmother paternal lung cancer and throat cancer   DOES/DOES NOT EC: does have a family history of cancer.  DOES/DOES NOT EC: does not have a family history of autoimmune disorders.    RESULTS/DATA     Pulmonary Function  "Test Results           Pulmonary Function Test - Scan on 6/19/2025  5:00 PM                  Chest Radiograph     XR chest 2 views 05/12/2024 - Impression - Stable exam with no acute radiographic abnormality.    Chest CT Scan     No testing done       Echocardiogram     No testing done          REVIEW OF SYSTEMS     REVIEW OF SYSTEMS  Review of Systems   HENT:  Positive for rhinorrhea and sinus pressure.    Respiratory:  Positive for shortness of breath and wheezing. Negative for cough.          PHYSICAL EXAM     VITAL SIGNS: LMP 01/31/2025 (Exact Date)  /72   Pulse 93   Resp 18   Ht 1.6 m (5' 3\")   Wt 86.4 kg (190 lb 6.4 oz)   LMP 01/31/2025 (Exact Date)   SpO2 97%   BMI 33.73 kg/m²      CURRENT WEIGHT: [unfilled]  BMI: [unfilled]  PREVIOUS WEIGHTS:  Wt Readings from Last 3 Encounters:   07/02/25 86.2 kg (190 lb)   06/04/25 84.8 kg (187 lb)   05/07/25 84.4 kg (186 lb)       Physical Exam  Constitutional:       Appearance: Normal appearance.   HENT:      Head: Normocephalic and atraumatic.      Right Ear: External ear normal.      Left Ear: External ear normal.      Nose: Nose normal.      Mouth/Throat:      Mouth: Mucous membranes are moist.      Pharynx: Oropharynx is clear.   Eyes:      Extraocular Movements: Extraocular movements intact.      Conjunctiva/sclera: Conjunctivae normal.      Pupils: Pupils are equal, round, and reactive to light.   Cardiovascular:      Rate and Rhythm: Normal rate and regular rhythm.      Pulses: Normal pulses.      Heart sounds: Normal heart sounds.   Pulmonary:      Effort: Pulmonary effort is normal.      Breath sounds: Normal breath sounds.   Abdominal:      General: Bowel sounds are normal.      Palpations: Abdomen is soft.   Musculoskeletal:         General: Normal range of motion.      Cervical back: Normal range of motion and neck supple.   Skin:     General: Skin is warm and dry.   Neurological:      General: No focal deficit present.      Mental Status: She is " alert and oriented to person, place, and time. Mental status is at baseline.   Psychiatric:         Mood and Affect: Mood normal.         Behavior: Behavior normal.         Thought Content: Thought content normal.         Judgment: Judgment normal.       ASSESSMENT/PLAN     Ms. Shauna Cortes is a 30 y.o. female and  has a past medical history of Asthma, Baker's cyst (N/a), Endometriosis, and Headache. She was referred to the [facility] for evaluation of asthma affecting pregnancy     Problem List and Orders      Assessment and Plan / Recommendations:  Problem List Items Addressed This Visit    None            Dyspnea 2/2 asthma   Previous PFT in 2023 showing normal Spirometry, +BD response, lung volumes shows air trapping  Diffusion capacity is normal  -  pulmonary function test with normal spirometry  byt lung volumes indicate restrictive disease, FENO 16  and 6 minute walk test  - consider CT chest after pregnancy   - albuterol hfa 2 puffs or albuterol nebs every 4-6 hours as needed      - Eosinophils 4/2024 - 880, repeat CBC with diff reveal 460 and negative RAST   - patient has been on Breztri x 1 year continue, cont 2 puffs twice a day and rinse and spit - prescription is reporting needs prior auth but has been on for 1 year   - cont singulair    - check echo with bubble and strain     Hypophayrnx - had previous sleep study that was negative for IRVIN     Has nasal polyps seen by Dr. John - refer back       Hypoxia: Presents to clinic today with O2 sat 98___% on room air. Oxywalk completed. __98__% ambulating on room air.      Thank you for visiting the Pulmonary clinic today!       Return to clinic after 4 weeks and after PFTs  or sooner if needed   Yanni Chacon CNP  My office number is [phone] -     Call to schedule  for radiology - CT scans/PFTs etc at  [phone]  General scheduling  [phone]     Best way to get a hold of me is to call my office -->  Please do not send me follow my health messages  Any test results will be discussed at next visit -- please make sure to make a follow up appt after testing.               [1]   Allergies  Allergen Reactions    Ibuprofen Rash, Shortness of breath, Unknown, GI Upset and Other     abd pain  vaginal bleeding    Acetaminophen Unknown    Acetaminophen-Caff-Pyrilamine Hives     midol    Acetaminophen-Pamabrom Hives and Itching    Midol (Acetaminoph-Pyrilamine) Unknown    Pamabrom Other   [2]   Current Outpatient Medications   Medication Sig Dispense Refill    albuterol 90 mcg/actuation inhaler INHALE 2 PUFFS BY MOUTH EVERY 4 HOURS IF NEEDED FOR WHEEZING. 18 g 11    aspirin 81 mg chewable tablet       budesonide-glycopyr-formoterol (Breztri Aerosphere) 160-9-4.8 mcg/actuation HFA aerosol inhaler Inhale 2 puffs 2 times a day. Rinse mouth with water after use to reduce aftertaste and incidence of candidiasis. Do not swallow. 24 g 3    famotidine (Pepcid) 20 mg tablet Take 1 tablet (20 mg) by mouth 2 times a day as needed for heartburn. 60 tablet 5    metoclopramide (Reglan) 10 mg tablet Take 1 tablet (10 mg) by mouth 3 times a day as needed (nausea/vomiting). 30 tablet 2    montelukast (Singulair) 10 mg tablet Take 1 tablet (10 mg) by mouth once daily at bedtime.      Prenatal Vitamin Plus Low Iron 27 mg iron- 1 mg tablet        No current facility-administered medications for this visit.   [3]   Past Surgical History:  Procedure Laterality Date    OTHER SURGICAL HISTORY  09/11/2020    Tonsillectomy    OTHER SURGICAL HISTORY  09/11/2020    Laparoscopy    TONSILLECTOMY     [4]   Family History  Problem Relation Name Age of Onset    Asthma Mother Danuta jackman     Heart failure Mother Danuta jackman     COPD Mother Danuta jackman     Congenital heart disease Mother Danuta jackman     Diabetes Maternal Grandfather Jose Miguel clark     Stroke Maternal Grandfather Jose Miguel clark     Diabetes Paternal Grandfather Rush chase     Cancer Paternal  Grandmother Evelyn soria     Thyroid disease Paternal Grandmother Evelyn sorai     Seizures Brother Gabe     Epilepsy Brother Gabe     Arthritis Brother Jonel     Learning disabilities Brother Jonel     COPD Mother Danuta jackman     Heart failure Mother Danuta jackman     Asthma Mother Danuta jackman     Heart disease Mother Danuta jackman     Congenital heart disease Mother Danuta jackman     Cancer Paternal Grandmother Evelyn soria     Thyroid disease Paternal Grandmother Evelyn soria     Diabetes Maternal Grandfather Jose Miguel clark     Stroke Maternal Grandfather Jose Miguel clark     Diabetes Paternal Grandfather Rush chase     Hearing loss Father Gerald chase

## 2025-07-02 ENCOUNTER — APPOINTMENT (OUTPATIENT)
Dept: OBSTETRICS AND GYNECOLOGY | Facility: CLINIC | Age: 30
End: 2025-07-02
Payer: COMMERCIAL

## 2025-07-02 VITALS — SYSTOLIC BLOOD PRESSURE: 132 MMHG | DIASTOLIC BLOOD PRESSURE: 82 MMHG | BODY MASS INDEX: 33.39 KG/M2 | WEIGHT: 190 LBS

## 2025-07-02 DIAGNOSIS — O99.210 OBESITY IN PREGNANCY (HHS-HCC): ICD-10-CM

## 2025-07-02 DIAGNOSIS — Z3A.21 21 WEEKS GESTATION OF PREGNANCY (HHS-HCC): Primary | ICD-10-CM

## 2025-07-02 DIAGNOSIS — O99.511 ASTHMA AFFECTING PREGNANCY IN FIRST TRIMESTER (HHS-HCC): ICD-10-CM

## 2025-07-02 DIAGNOSIS — J45.909 ASTHMA AFFECTING PREGNANCY IN FIRST TRIMESTER (HHS-HCC): ICD-10-CM

## 2025-07-02 PROCEDURE — 99213 OFFICE O/P EST LOW 20 MIN: CPT | Performed by: OBSTETRICS & GYNECOLOGY

## 2025-07-02 NOTE — PROGRESS NOTES
Routine prenatal visit     Subjective    HPI:  Feeling OK.  Fatigued and still having nausea and reflux.  Zofran is helping.  Had normal anatomy USN.  Given glucola supplies for next visit.  Normal cervical length.  Feeling FM daily now.     Objective    Vital Signs  /82   Wt 86.2 kg (190 lb)   LMP 2025 (Exact Date)   BMI 33.39 kg/m²     Joey Cortes is a 30 y.o. yo  at 21w5d here for the following concerns which we addressed today:     Medical Problems       Problem List       Nasal obstruction    Nasal polyp    Hypertrophy of both inferior nasal turbinates    DNS (deviated nasal septum)    21 weeks gestation of pregnancy ([facility])    Overview Addendum 2025  1:03 PM by Nusrat Villegas MD   Desired provider in labor: [] CNM  [x] Physician   [] Either Acceptable  [x] Blood Products: [x] Yes, accepts [] No, needs counseling  [x] Initial BMI: 31.40   [x] Prenatal Labs: UTD  [x] Cervical Cancer Screening up to date: normal, negative HPV    [x] Rh status: positive   [x] Screen for IPV and Substance Use Risk  [x] Genetic Screening (cfDNA): rr cfDNA screening, it's a GIRL   [x] First Trimester Anatomy Screen (11-13.6 wks): WNL   [x] Baby ASA: Recommended - pt declines   [x] Pregnancy dated by: sure LMP c/w 9 week USN     [x] Anatomy US: (19-20 wks): WNL  [] Federal Sterilization consent signed (if indicated):  [] 1hr GCT at 24-28wks: <> given supplies for next visit   [] Fetal Surveillance (if indicated):  [] Tdap (27-32 wks, may be given up to 36 wks if initial window missed):   [] RSV (32-36 wks) (Sept. to end of ):     [] Feeding Intentions:  [] Postpartum Birth control method:   [] GBS at 36 - 37 wks:  [] 39 weeks discussion of IOL vs. Expectant management:  [] Mode of delivery ( anticipated ):           Obesity in pregnancy ([facility])    Overview Signed 2025  1:46 PM by Nusrat Villegas MD   - BMI 32 at first visit          History of  delivery     Overview Addendum 2025  2:41 PM by Nusrat Villegas MD   - PTL and unintentional home delivery at 33.0 in first pregnancy  - term  (37.2) with second pregnancy - did receive 17-OHP during this pregnancy   - normal CL to date - has another one scheduled          Asthma affecting pregnancy in first trimester ([facility])    Overview Addendum 2025  1:03 PM by Nusrat Villegas MD   - uses Breztri inhaler BID (combination inhaler including budesonide, glycopyrrolate, and formoterol)  - Has rescue inhaler but rarely uses it     <> has follow up appt with pulm            Carpal tunnel syndrome, right upper limb        Follow up in 4 week(s).

## 2025-07-07 ENCOUNTER — APPOINTMENT (OUTPATIENT)
Facility: CLINIC | Age: 30
End: 2025-07-07
Payer: COMMERCIAL

## 2025-07-07 VITALS
HEART RATE: 93 BPM | BODY MASS INDEX: 33.73 KG/M2 | SYSTOLIC BLOOD PRESSURE: 105 MMHG | OXYGEN SATURATION: 97 % | RESPIRATION RATE: 18 BRPM | WEIGHT: 190.4 LBS | DIASTOLIC BLOOD PRESSURE: 72 MMHG | HEIGHT: 63 IN

## 2025-07-07 DIAGNOSIS — R06.02 SHORTNESS OF BREATH: Primary | ICD-10-CM

## 2025-07-07 DIAGNOSIS — O99.511 ASTHMA AFFECTING PREGNANCY IN FIRST TRIMESTER (HHS-HCC): ICD-10-CM

## 2025-07-07 DIAGNOSIS — J45.909 ASTHMA AFFECTING PREGNANCY IN FIRST TRIMESTER (HHS-HCC): ICD-10-CM

## 2025-07-07 DIAGNOSIS — J33.9 NASAL POLYP: ICD-10-CM

## 2025-07-07 PROCEDURE — 1036F TOBACCO NON-USER: CPT | Performed by: NURSE PRACTITIONER

## 2025-07-07 PROCEDURE — G8433 SCR FOR DEP NOT CPT DOC RSN: HCPCS | Performed by: NURSE PRACTITIONER

## 2025-07-07 PROCEDURE — 3008F BODY MASS INDEX DOCD: CPT | Performed by: NURSE PRACTITIONER

## 2025-07-07 PROCEDURE — 99214 OFFICE O/P EST MOD 30 MIN: CPT | Performed by: NURSE PRACTITIONER

## 2025-07-07 ASSESSMENT — ASTHMA QUESTIONNAIRES
ACT_TOTALSCORE: 11
QUESTION_1 LAST FOUR WEEKS HOW MUCH OF THE TIME DID YOUR ASTHMA KEEP YOU FROM GETTING AS MUCH DONE AT WORK, SCHOOL OR AT HOME: MOST OF THE TIME
QUESTION_5 LAST FOUR WEEKS HOW WOULD YOU RATE YOUR ASTHMA CONTROL: POORLY CONTROLLED
QUESTION_3 LAST FOUR WEEKS HOW OFTEN DID YOUR ASTHMA SYMPTOMS (WHEEZING, COUGHING, SHORTNESS OF BREATH, CHEST TIGHTNESS OR PAIN) WAKE YOU UP AT NIGHT OR EARLIER THAN USUAL IN THE MORNING: 2-3 NIGHTS A WEEK
QUESTION_4 LAST FOUR WEEKS HOW OFTEN HAVE YOU USED YOUR RESCUE INHALER OR NEBULIZER MEDICATION (SUCH AS ALBUTEROL): ONCE A WEEK OR LESS
QUESTION_2 LAST FOUR WEEKS HOW OFTEN HAVE YOU HAD SHORTNESS OF BREATH: MORE THAN ONCE A DAY

## 2025-07-07 ASSESSMENT — COLUMBIA-SUICIDE SEVERITY RATING SCALE - C-SSRS
1. IN THE PAST MONTH, HAVE YOU WISHED YOU WERE DEAD OR WISHED YOU COULD GO TO SLEEP AND NOT WAKE UP?: NO
2. HAVE YOU ACTUALLY HAD ANY THOUGHTS OF KILLING YOURSELF?: NO
6. HAVE YOU EVER DONE ANYTHING, STARTED TO DO ANYTHING, OR PREPARED TO DO ANYTHING TO END YOUR LIFE?: NO

## 2025-07-07 ASSESSMENT — ENCOUNTER SYMPTOMS
LOSS OF SENSATION IN FEET: 0
OCCASIONAL FEELINGS OF UNSTEADINESS: 0
SINUS PRESSURE: 1
RHINORRHEA: 1
COUGH: 0
DEPRESSION: 0

## 2025-07-07 ASSESSMENT — PATIENT HEALTH QUESTIONNAIRE - PHQ9
SUM OF ALL RESPONSES TO PHQ9 QUESTIONS 1 AND 2: 0
2. FEELING DOWN, DEPRESSED OR HOPELESS: NOT AT ALL
1. LITTLE INTEREST OR PLEASURE IN DOING THINGS: NOT AT ALL

## 2025-07-07 NOTE — PATIENT INSTRUCTIONS
Dyspnea 2/2 asthma   Previous PFT in 2023 showing normal Spirometry, +BD response, lung volumes shows air trapping  Diffusion capacity is normal  -  pulmonary function test with normal spirometry  byt lung volumes indicate restrictive disease, FENO 16  and 6 minute walk test  - consider CT chest after pregnancy   - albuterol hfa 2 puffs or albuterol nebs every 4-6 hours as needed      - Eosinophils 4/2024 - 880, repeat CBC with diff reveal 460 and negative RAST   - patient has been on Breztri x 1 year continue, cont 2 puffs twice a day and rinse and spit - prescription is reporting needs prior auth but has been on for 1 year   - cont singulair    - check echo with bubble and strain     Hypophayrnx - had previous sleep study that was negative for IRVIN     Has nasal polyps seen by Dr. John - refer back     Thank you for visiting the Pulmonary clinic today!       Return to clinic after 4 weeks and after PFTs  or sooner if needed   Yanni Chacon CNP  My office number is [phone] -     Call to schedule  for radiology - CT scans/PFTs etc at  [phone]  General scheduling  [phone]     Best way to get a hold of me is to call my office --> Please do not send me follow my health messages  Any test results will be discussed at next visit -- please make sure to make a follow up appt after testing.

## 2025-07-08 ENCOUNTER — TELEPHONE (OUTPATIENT)
Dept: OBSTETRICS AND GYNECOLOGY | Facility: CLINIC | Age: 30
End: 2025-07-08

## 2025-07-08 ENCOUNTER — HOSPITAL ENCOUNTER (OUTPATIENT)
Dept: RADIOLOGY | Facility: CLINIC | Age: 30
Discharge: HOME | End: 2025-07-08
Payer: COMMERCIAL

## 2025-07-08 DIAGNOSIS — Z34.90 PRENATAL CARE, ANTEPARTUM, UNSPECIFIED GRAVIDITY: ICD-10-CM

## 2025-07-08 PROCEDURE — 76815 OB US LIMITED FETUS(S): CPT | Performed by: OBSTETRICS & GYNECOLOGY

## 2025-07-08 PROCEDURE — 76817 TRANSVAGINAL US OBSTETRIC: CPT | Performed by: OBSTETRICS & GYNECOLOGY

## 2025-07-08 PROCEDURE — 76815 OB US LIMITED FETUS(S): CPT

## 2025-07-08 PROCEDURE — 76817 TRANSVAGINAL US OBSTETRIC: CPT

## 2025-07-08 NOTE — TELEPHONE ENCOUNTER
22.4 wk ob called stating she was just seen at [facility] imaging for her cervical length ultrasound and had some concerns.  She states they had to stop her scan a few times due to having bridgette contreras contractions but they were able to eventually finish exam.  After scan, while driving home, she did experience some cramping when driving home but they have since subsided.    Currently patient denies spotting, bleeding, LOF, watery vaginal discharge, cramping, tightening or pressure.  With her last 2 pregnancies she didn't start experiencing bridgette contreras contractions until closer to 30 weeks, so to have them at 22.4 weeks is very alarming.  She has noticed, after doing a lot of walking, she will experiencing some cramping but after sitting down and resting, it will subside.    Patient advised to call office if she starts experiencing any vaginal bleeding, spotting, LOF, watery vaginal discharge, cramping, vaginal pressure or tightening.  Depending on situation, it may be advised for her to go to [facility] tri[facility] vs office for evaluation due to her history of PTD and current gestational age.    Patient assured I will forward message onto Dr Villegas to make her aware and will reach out with any further recommendations.    Next ob follow up 8/5 with Dr Villegas

## 2025-07-22 ENCOUNTER — APPOINTMENT (OUTPATIENT)
Facility: CLINIC | Age: 30
End: 2025-07-22
Payer: COMMERCIAL

## 2025-07-22 ENCOUNTER — HOSPITAL ENCOUNTER (EMERGENCY)
Facility: HOSPITAL | Age: 30
Discharge: HOME | End: 2025-07-23
Attending: EMERGENCY MEDICINE
Payer: COMMERCIAL

## 2025-07-22 ENCOUNTER — OFFICE VISIT (OUTPATIENT)
Dept: OTOLARYNGOLOGY | Facility: CLINIC | Age: 30
End: 2025-07-22
Payer: COMMERCIAL

## 2025-07-22 DIAGNOSIS — J34.2 DNS (DEVIATED NASAL SEPTUM): ICD-10-CM

## 2025-07-22 DIAGNOSIS — J33.9 NASAL POLYP: ICD-10-CM

## 2025-07-22 DIAGNOSIS — M54.50 ACUTE LOW BACK PAIN WITHOUT SCIATICA, UNSPECIFIED BACK PAIN LATERALITY: ICD-10-CM

## 2025-07-22 DIAGNOSIS — R51.9 ACUTE NONINTRACTABLE HEADACHE, UNSPECIFIED HEADACHE TYPE: Primary | ICD-10-CM

## 2025-07-22 DIAGNOSIS — J34.89 NASAL OBSTRUCTION: Primary | ICD-10-CM

## 2025-07-22 DIAGNOSIS — J34.3 HYPERTROPHY OF BOTH INFERIOR NASAL TURBINATES: ICD-10-CM

## 2025-07-22 DIAGNOSIS — R10.2 PELVIC PAIN: ICD-10-CM

## 2025-07-22 LAB
ALBUMIN SERPL BCP-MCNC: 3.9 G/DL (ref 3.4–5)
ALP SERPL-CCNC: 56 U/L (ref 33–110)
ALT SERPL W P-5'-P-CCNC: 20 U/L (ref 7–45)
ANION GAP SERPL CALC-SCNC: 13 MMOL/L (ref 10–20)
APPEARANCE UR: ABNORMAL
AST SERPL W P-5'-P-CCNC: 16 U/L (ref 9–39)
BASOPHILS # BLD AUTO: 0.06 X10*3/UL (ref 0–0.1)
BASOPHILS NFR BLD AUTO: 0.3 %
BILIRUB SERPL-MCNC: 0.4 MG/DL (ref 0–1.2)
BILIRUB UR STRIP.AUTO-MCNC: NEGATIVE MG/DL
BUN SERPL-MCNC: 3 MG/DL (ref 6–23)
CALCIUM SERPL-MCNC: 8.9 MG/DL (ref 8.6–10.3)
CHLORIDE SERPL-SCNC: 101 MMOL/L (ref 98–107)
CO2 SERPL-SCNC: 23 MMOL/L (ref 21–32)
COLOR UR: ABNORMAL
CREAT SERPL-MCNC: 0.49 MG/DL (ref 0.5–1.05)
EGFRCR SERPLBLD CKD-EPI 2021: >90 ML/MIN/1.73M*2
EOSINOPHIL # BLD AUTO: 0.47 X10*3/UL (ref 0–0.7)
EOSINOPHIL NFR BLD AUTO: 2.5 %
ERYTHROCYTE [DISTWIDTH] IN BLOOD BY AUTOMATED COUNT: 13.9 % (ref 11.5–14.5)
GLUCOSE SERPL-MCNC: 94 MG/DL (ref 74–99)
GLUCOSE UR STRIP.AUTO-MCNC: NORMAL MG/DL
HCT VFR BLD AUTO: 36.2 % (ref 36–46)
HGB BLD-MCNC: 12.1 G/DL (ref 12–16)
IMM GRANULOCYTES # BLD AUTO: 0.1 X10*3/UL (ref 0–0.7)
IMM GRANULOCYTES NFR BLD AUTO: 0.5 % (ref 0–0.9)
KETONES UR STRIP.AUTO-MCNC: NEGATIVE MG/DL
LEUKOCYTE ESTERASE UR QL STRIP.AUTO: NEGATIVE
LYMPHOCYTES # BLD AUTO: 1.45 X10*3/UL (ref 1.2–4.8)
LYMPHOCYTES NFR BLD AUTO: 7.8 %
MAGNESIUM SERPL-MCNC: 1.66 MG/DL (ref 1.6–2.4)
MCH RBC QN AUTO: 30.6 PG (ref 26–34)
MCHC RBC AUTO-ENTMCNC: 33.4 G/DL (ref 32–36)
MCV RBC AUTO: 91 FL (ref 80–100)
MONOCYTES # BLD AUTO: 1.39 X10*3/UL (ref 0.1–1)
MONOCYTES NFR BLD AUTO: 7.5 %
NEUTROPHILS # BLD AUTO: 15.12 X10*3/UL (ref 1.2–7.7)
NEUTROPHILS NFR BLD AUTO: 81.4 %
NITRITE UR QL STRIP.AUTO: NEGATIVE
NRBC BLD-RTO: 0 /100 WBCS (ref 0–0)
PH UR STRIP.AUTO: 5.5 [PH]
PLATELET # BLD AUTO: 242 X10*3/UL (ref 150–450)
POTASSIUM SERPL-SCNC: 3.8 MMOL/L (ref 3.5–5.3)
PROT SERPL-MCNC: 7 G/DL (ref 6.4–8.2)
PROT UR STRIP.AUTO-MCNC: NEGATIVE MG/DL
RBC # BLD AUTO: 3.96 X10*6/UL (ref 4–5.2)
RBC # UR STRIP.AUTO: NEGATIVE MG/DL
SODIUM SERPL-SCNC: 133 MMOL/L (ref 136–145)
SP GR UR STRIP.AUTO: 1.01
UROBILINOGEN UR STRIP.AUTO-MCNC: NORMAL MG/DL
WBC # BLD AUTO: 18.6 X10*3/UL (ref 4.4–11.3)

## 2025-07-22 PROCEDURE — 81003 URINALYSIS AUTO W/O SCOPE: CPT | Performed by: EMERGENCY MEDICINE

## 2025-07-22 PROCEDURE — 84702 CHORIONIC GONADOTROPIN TEST: CPT | Performed by: EMERGENCY MEDICINE

## 2025-07-22 PROCEDURE — 85025 COMPLETE CBC W/AUTO DIFF WBC: CPT | Performed by: EMERGENCY MEDICINE

## 2025-07-22 PROCEDURE — 93005 ELECTROCARDIOGRAM TRACING: CPT

## 2025-07-22 PROCEDURE — 36415 COLL VENOUS BLD VENIPUNCTURE: CPT | Performed by: EMERGENCY MEDICINE

## 2025-07-22 PROCEDURE — 87636 SARSCOV2 & INF A&B AMP PRB: CPT | Performed by: EMERGENCY MEDICINE

## 2025-07-22 PROCEDURE — 99213 OFFICE O/P EST LOW 20 MIN: CPT | Performed by: OTOLARYNGOLOGY

## 2025-07-22 PROCEDURE — 83735 ASSAY OF MAGNESIUM: CPT | Performed by: EMERGENCY MEDICINE

## 2025-07-22 PROCEDURE — 99285 EMERGENCY DEPT VISIT HI MDM: CPT | Performed by: EMERGENCY MEDICINE

## 2025-07-22 PROCEDURE — 80053 COMPREHEN METABOLIC PANEL: CPT | Performed by: EMERGENCY MEDICINE

## 2025-07-22 RX ORDER — ACETAMINOPHEN 325 MG/1
650 TABLET ORAL ONCE
Status: COMPLETED | OUTPATIENT
Start: 2025-07-23 | End: 2025-07-23

## 2025-07-22 ASSESSMENT — PAIN DESCRIPTION - PAIN TYPE: TYPE: ACUTE PAIN

## 2025-07-22 ASSESSMENT — PAIN SCALES - GENERAL
PAINLEVEL_OUTOF10: 0 - NO PAIN
PAINLEVEL_OUTOF10: 8

## 2025-07-22 ASSESSMENT — PAIN DESCRIPTION - LOCATION: LOCATION: BACK

## 2025-07-22 ASSESSMENT — PAIN - FUNCTIONAL ASSESSMENT: PAIN_FUNCTIONAL_ASSESSMENT: 0-10

## 2025-07-22 NOTE — PROGRESS NOTES
Subjective   Patient ID: Joey Cortes is a 30 y.o. female who presents for Nasal Polyps.    HPI  The patient returns, being seen for nasal polyps, nasal obstruction. She has been dealing with this for some time now. Reports a lot of clear nasal drainage particularly when she is outside. Has more difficulty breathing when she is in air conditioning. Was going to have CT sinus done that we ordered previously but then found out she is pregnant. Every time she has tried using nasal steroid spray since becoming pregnant, she throws up. She is using it appropriately and aiming gently outward, but is unable to tolerate the spray. She has failed trial of systemic steroid in the past as well.     All remaining head neck inquiry otherwise negative.     Review of Systems   Constitutional: Negative.    HENT: Negative.     Respiratory: Negative.     Cardiovascular: Negative.    Neurological: Negative.      Physical Exam  General appearance: No acute distress. Normal facies. Symmetric facial movement. No gross lesions of the face are noted.  Ears:  The external ear structures appear normal. The ear canals patent and the tympanic membranes are intact without evidence of air-fluid levels, retraction, or congenital defects.    Nose:  Anterior rhinoscopy notes a mildly deviated septum. Examination is noted for normal healthy mucosal membranes without any evidence of lesions, or exudate. Polypoid degeneration of middle turbinates noted.   Throat/Oral mucosa:  The tongue is normally mobile. There are no lesions on the gingiva, buccal, or oral mucosa. There are no oral cavity masses.  Neck:  The neck is negative for mass lymphadenopathy. The trachea and parotid are clear. The thyroid bed is grossly unremarkable. The salivary gland structures are grossly unremarkable.    Assessment/Plan   30yr old female patient presents with c/o ongoing nasal obstruction. She has a history of nasal polyps, septal deviation, hypertrophy  of nasal turbinates. Is 24wks pregnant and struggling with rhinitis of pregnancy and is having particular difficulty breathing at night when trying to sleep. Would recommend that she check with OB to see if the following plan would be acceptable while pregnant: 1 spray of Afrin, wait 10 minutes then 2 sprays of nasal steroid. In particular would like OB to okay use of Afrin during pregnancy. Once the baby is born, we will get the CT done and hopefully be able to better address her polyps.

## 2025-07-23 ENCOUNTER — APPOINTMENT (OUTPATIENT)
Dept: RADIOLOGY | Facility: HOSPITAL | Age: 30
End: 2025-07-23
Payer: COMMERCIAL

## 2025-07-23 ENCOUNTER — APPOINTMENT (OUTPATIENT)
Dept: CARDIOLOGY | Facility: HOSPITAL | Age: 30
End: 2025-07-23
Payer: COMMERCIAL

## 2025-07-23 ENCOUNTER — HOSPITAL ENCOUNTER (OUTPATIENT)
Facility: HOSPITAL | Age: 30
End: 2025-07-23
Attending: OBSTETRICS & GYNECOLOGY | Admitting: OBSTETRICS & GYNECOLOGY
Payer: COMMERCIAL

## 2025-07-23 ENCOUNTER — HOSPITAL ENCOUNTER (OUTPATIENT)
Facility: HOSPITAL | Age: 30
Discharge: HOME | End: 2025-07-23
Attending: OBSTETRICS & GYNECOLOGY | Admitting: OBSTETRICS & GYNECOLOGY
Payer: COMMERCIAL

## 2025-07-23 VITALS
OXYGEN SATURATION: 97 % | RESPIRATION RATE: 18 BRPM | HEART RATE: 80 BPM | WEIGHT: 190 LBS | BODY MASS INDEX: 33.66 KG/M2 | TEMPERATURE: 98.2 F | DIASTOLIC BLOOD PRESSURE: 71 MMHG | SYSTOLIC BLOOD PRESSURE: 120 MMHG | HEIGHT: 63 IN

## 2025-07-23 VITALS
WEIGHT: 190 LBS | RESPIRATION RATE: 20 BRPM | HEART RATE: 91 BPM | BODY MASS INDEX: 33.66 KG/M2 | DIASTOLIC BLOOD PRESSURE: 53 MMHG | OXYGEN SATURATION: 96 % | HEIGHT: 63 IN | TEMPERATURE: 97 F | SYSTOLIC BLOOD PRESSURE: 103 MMHG

## 2025-07-23 LAB
ATRIAL RATE: 92 BPM
B-HCG SERPL-ACNC: ABNORMAL MIU/ML
C TRACH RRNA SPEC QL NAA+PROBE: NEGATIVE
CLUE CELLS SPEC QL WET PREP: NORMAL
FLUAV RNA RESP QL NAA+PROBE: NOT DETECTED
FLUBV RNA RESP QL NAA+PROBE: NOT DETECTED
HOLD SPECIMEN: NORMAL
LACTATE SERPL-SCNC: 0.7 MMOL/L (ref 0.4–2)
N GONORRHOEA DNA SPEC QL PROBE+SIG AMP: NEGATIVE
P AXIS: 14 DEGREES
P OFFSET: 190 MS
P ONSET: 147 MS
PR INTERVAL: 138 MS
Q ONSET: 216 MS
QRS COUNT: 15 BEATS
QRS DURATION: 88 MS
QT INTERVAL: 370 MS
QTC CALCULATION(BAZETT): 457 MS
QTC FREDERICIA: 426 MS
R AXIS: 25 DEGREES
SARS-COV-2 RNA RESP QL NAA+PROBE: NOT DETECTED
T AXIS: 10 DEGREES
T OFFSET: 401 MS
T VAGINALIS RRNA SPEC QL NAA+PROBE: NEGATIVE
T VAGINALIS SPEC QL WET PREP: NORMAL
TRICHOMONAS REFLEX COMMENT: NORMAL
VENTRICULAR RATE: 92 BPM
WBC VAG QL WET PREP: NORMAL
YEAST VAG QL WET PREP: NORMAL

## 2025-07-23 PROCEDURE — 76815 OB US LIMITED FETUS(S): CPT | Performed by: RADIOLOGY

## 2025-07-23 PROCEDURE — 87075 CULTR BACTERIA EXCEPT BLOOD: CPT | Mod: 59,STJLAB | Performed by: EMERGENCY MEDICINE

## 2025-07-23 PROCEDURE — 96360 HYDRATION IV INFUSION INIT: CPT

## 2025-07-23 PROCEDURE — 87205 SMEAR GRAM STAIN: CPT | Mod: STJLAB | Performed by: EMERGENCY MEDICINE

## 2025-07-23 PROCEDURE — 76705 ECHO EXAM OF ABDOMEN: CPT

## 2025-07-23 PROCEDURE — 87491 CHLMYD TRACH DNA AMP PROBE: CPT | Mod: STJLAB | Performed by: EMERGENCY MEDICINE

## 2025-07-23 PROCEDURE — 83605 ASSAY OF LACTIC ACID: CPT | Performed by: EMERGENCY MEDICINE

## 2025-07-23 PROCEDURE — 36415 COLL VENOUS BLD VENIPUNCTURE: CPT | Performed by: EMERGENCY MEDICINE

## 2025-07-23 PROCEDURE — 2500000001 HC RX 250 WO HCPCS SELF ADMINISTERED DRUGS (ALT 637 FOR MEDICARE OP): Performed by: EMERGENCY MEDICINE

## 2025-07-23 PROCEDURE — 87210 SMEAR WET MOUNT SALINE/INK: CPT | Performed by: EMERGENCY MEDICINE

## 2025-07-23 PROCEDURE — 76705 ECHO EXAM OF ABDOMEN: CPT | Performed by: RADIOLOGY

## 2025-07-23 PROCEDURE — 96361 HYDRATE IV INFUSION ADD-ON: CPT

## 2025-07-23 PROCEDURE — 2500000004 HC RX 250 GENERAL PHARMACY W/ HCPCS (ALT 636 FOR OP/ED): Performed by: EMERGENCY MEDICINE

## 2025-07-23 PROCEDURE — 87661 TRICHOMONAS VAGINALIS AMPLIF: CPT | Mod: STJLAB | Performed by: EMERGENCY MEDICINE

## 2025-07-23 PROCEDURE — 99213 OFFICE O/P EST LOW 20 MIN: CPT | Performed by: OBSTETRICS & GYNECOLOGY

## 2025-07-23 PROCEDURE — 99213 OFFICE O/P EST LOW 20 MIN: CPT

## 2025-07-23 PROCEDURE — 76815 OB US LIMITED FETUS(S): CPT

## 2025-07-23 RX ADMIN — ACETAMINOPHEN 650 MG: 325 TABLET ORAL at 00:41

## 2025-07-23 RX ADMIN — SODIUM CHLORIDE 500 ML: 0.9 INJECTION, SOLUTION INTRAVENOUS at 00:41

## 2025-07-23 SDOH — HEALTH STABILITY: MENTAL HEALTH: NON-SPECIFIC ACTIVE SUICIDAL THOUGHTS (PAST 1 MONTH): NO

## 2025-07-23 SDOH — HEALTH STABILITY: MENTAL HEALTH: SUICIDAL BEHAVIOR (LIFETIME): NO

## 2025-07-23 SDOH — SOCIAL STABILITY: SOCIAL INSECURITY: HAS ANYONE EVER THREATENED TO HURT YOUR FAMILY OR YOUR PETS?: NO

## 2025-07-23 SDOH — SOCIAL STABILITY: SOCIAL INSECURITY: PHYSICAL ABUSE: DENIES

## 2025-07-23 SDOH — HEALTH STABILITY: MENTAL HEALTH: WISH TO BE DEAD (PAST 1 MONTH): NO

## 2025-07-23 SDOH — ECONOMIC STABILITY: HOUSING INSECURITY: DO YOU FEEL UNSAFE GOING BACK TO THE PLACE WHERE YOU ARE LIVING?: NO

## 2025-07-23 SDOH — SOCIAL STABILITY: SOCIAL INSECURITY: DO YOU FEEL ANYONE HAS EXPLOITED OR TAKEN ADVANTAGE OF YOU FINANCIALLY OR OF YOUR PERSONAL PROPERTY?: NO

## 2025-07-23 SDOH — SOCIAL STABILITY: SOCIAL INSECURITY: HAVE YOU HAD THOUGHTS OF HARMING ANYONE ELSE?: NO

## 2025-07-23 SDOH — SOCIAL STABILITY: SOCIAL INSECURITY: ARE THERE ANY APPARENT SIGNS OF INJURIES/BEHAVIORS THAT COULD BE RELATED TO ABUSE/NEGLECT?: NO

## 2025-07-23 SDOH — SOCIAL STABILITY: SOCIAL INSECURITY: VERBAL ABUSE: DENIES

## 2025-07-23 SDOH — SOCIAL STABILITY: SOCIAL INSECURITY: ABUSE SCREEN: ADULT

## 2025-07-23 SDOH — SOCIAL STABILITY: SOCIAL INSECURITY: HAVE YOU HAD ANY THOUGHTS OF HARMING ANYONE ELSE?: NO

## 2025-07-23 SDOH — SOCIAL STABILITY: SOCIAL INSECURITY: DOES ANYONE TRY TO KEEP YOU FROM HAVING/CONTACTING OTHER FRIENDS OR DOING THINGS OUTSIDE YOUR HOME?: NO

## 2025-07-23 SDOH — HEALTH STABILITY: MENTAL HEALTH: WERE YOU ABLE TO COMPLETE ALL THE BEHAVIORAL HEALTH SCREENINGS?: YES

## 2025-07-23 SDOH — SOCIAL STABILITY: SOCIAL INSECURITY: ARE YOU OR HAVE YOU BEEN THREATENED OR ABUSED PHYSICALLY, EMOTIONALLY, OR SEXUALLY BY ANYONE?: NO

## 2025-07-23 ASSESSMENT — PAIN SCALES - GENERAL
PAINLEVEL_OUTOF10: 6
PAINLEVEL_OUTOF10: 0 - NO PAIN

## 2025-07-23 ASSESSMENT — PATIENT HEALTH QUESTIONNAIRE - PHQ9
1. LITTLE INTEREST OR PLEASURE IN DOING THINGS: NOT AT ALL
SUM OF ALL RESPONSES TO PHQ9 QUESTIONS 1 & 2: 0
2. FEELING DOWN, DEPRESSED OR HOPELESS: NOT AT ALL

## 2025-07-23 ASSESSMENT — LIFESTYLE VARIABLES
HOW OFTEN DO YOU HAVE 6 OR MORE DRINKS ON ONE OCCASION: NEVER
HOW OFTEN DO YOU HAVE A DRINK CONTAINING ALCOHOL: NEVER
AUDIT-C TOTAL SCORE: 0
AUDIT-C TOTAL SCORE: 0
SKIP TO QUESTIONS 9-10: 1
HOW MANY STANDARD DRINKS CONTAINING ALCOHOL DO YOU HAVE ON A TYPICAL DAY: PATIENT DOES NOT DRINK

## 2025-07-23 NOTE — H&P
"Obstetrical DC Triage Note    Reason for Triage Observation: Non-stress Test (Was in ED for viral illness.  To get NST per provider)    Assessment   pain in back    Plan   Cervix closed and thick, labs from ED reviewed, FHR tracing adequate for 25 weeks  Discharge home in stable condition  PTL precautions given    Subjective   History of Present Pregnancy  Joey Cortes is a 30 y.o.  gravid, female. Patient's last menstrual period was 2025 (exact date). with an Estimated Date of Delivery of 2025, by Last Menstrual Period, who is 24w5d gestation. The patient's blood type is A POS.     Description of Present Problem   The patient presented to OB Triage with generalized malaise and back discomfort. Prior history of 31 week delivery    Pregnancy Complications  No pregnancy complications identified.    Objective   Physical Examination:  Vital Signs:  /71   Pulse 80   Temp 36.8 °C (98.2 °F) (Oral)   Resp 18   Ht 1.6 m (5' 3\")   Wt 86.2 kg (190 lb)   BMI 33.66 kg/m²   GENERAL: Examination reveals a well developed, well nourished, gravid female in no acute distress. She is alert and cooperative.  LUNGS: Normal effort  HEART: Normal rate  ABDOMEN: soft, gravid, nontender, nondistended, no abnormal masses, no epigastric pain  CERVIX:   cm dilated,   % effaced,   station; MEMBRANES are  CL/TH/High  EXTREMITIES: no redness or tenderness in the calves or thighs, no edema  SKIN: normal coloration and turgor, no rashes  NEUROLOGICAL: alert, oriented, normal speech, no focal findings or movement disorder noted  PSYCHOLOGICAL: awake and alert; oriented to person, place, and time    Results for orders placed or performed during the hospital encounter of 25   CBC and Auto Differential    Collection Time: 25 11:02 PM   Result Value Ref Range    WBC 18.6 (H) 4.4 - 11.3 x10*3/uL    nRBC 0.0 0.0 - 0.0 /100 WBCs    RBC 3.96 (L) 4.00 - 5.20 x10*6/uL    Hemoglobin 12.1 12.0 - 16.0 " Set them up for a video visit tomorrow at 9:30 Sandra Sukumar his wife is more than welcome to be on the video visit. g/dL    Hematocrit 36.2 36.0 - 46.0 %    MCV 91 80 - 100 fL    MCH 30.6 26.0 - 34.0 pg    MCHC 33.4 32.0 - 36.0 g/dL    RDW 13.9 11.5 - 14.5 %    Platelets 242 150 - 450 x10*3/uL    Neutrophils % 81.4 40.0 - 80.0 %    Immature Granulocytes %, Automated 0.5 0.0 - 0.9 %    Lymphocytes % 7.8 13.0 - 44.0 %    Monocytes % 7.5 2.0 - 10.0 %    Eosinophils % 2.5 0.0 - 6.0 %    Basophils % 0.3 0.0 - 2.0 %    Neutrophils Absolute 15.12 (H) 1.20 - 7.70 x10*3/uL    Immature Granulocytes Absolute, Automated 0.10 0.00 - 0.70 x10*3/uL    Lymphocytes Absolute 1.45 1.20 - 4.80 x10*3/uL    Monocytes Absolute 1.39 (H) 0.10 - 1.00 x10*3/uL    Eosinophils Absolute 0.47 0.00 - 0.70 x10*3/uL    Basophils Absolute 0.06 0.00 - 0.10 x10*3/uL   Comprehensive metabolic panel    Collection Time: 07/22/25 11:02 PM   Result Value Ref Range    Glucose 94 74 - 99 mg/dL    Sodium 133 (L) 136 - 145 mmol/L    Potassium 3.8 3.5 - 5.3 mmol/L    Chloride 101 98 - 107 mmol/L    Bicarbonate 23 21 - 32 mmol/L    Anion Gap 13 10 - 20 mmol/L    Urea Nitrogen 3 (L) 6 - 23 mg/dL    Creatinine 0.49 (L) 0.50 - 1.05 mg/dL    eGFR >90 >60 mL/min/1.73m*2    Calcium 8.9 8.6 - 10.3 mg/dL    Albumin 3.9 3.4 - 5.0 g/dL    Alkaline Phosphatase 56 33 - 110 U/L    Total Protein 7.0 6.4 - 8.2 g/dL    AST 16 9 - 39 U/L    Bilirubin, Total 0.4 0.0 - 1.2 mg/dL    ALT 20 7 - 45 U/L   hCG, quantitative, pregnancy    Collection Time: 07/22/25 11:02 PM   Result Value Ref Range    HCG, Beta-Quantitative 12,256 (H) <5 mIU/mL   Magnesium    Collection Time: 07/22/25 11:02 PM   Result Value Ref Range    Magnesium 1.66 1.60 - 2.40 mg/dL   Urinalysis with Reflex Culture and Microscopic    Collection Time: 07/22/25 11:03 PM   Result Value Ref Range    Color, Urine Light-Yellow Light-Yellow, Yellow, Dark-Yellow    Appearance, Urine Turbid (N) Clear    Specific Gravity, Urine 1.010 1.005 - 1.035    pH, Urine 5.5 5.0, 5.5, 6.0, 6.5, 7.0, 7.5, 8.0    Protein, Urine NEGATIVE NEGATIVE, 10  (TRACE), 20 (TRACE) mg/dL    Glucose, Urine Normal Normal mg/dL    Blood, Urine NEGATIVE NEGATIVE mg/dL    Ketones, Urine NEGATIVE NEGATIVE mg/dL    Bilirubin, Urine NEGATIVE NEGATIVE mg/dL    Urobilinogen, Urine Normal Normal mg/dL    Nitrite, Urine NEGATIVE NEGATIVE    Leukocyte Esterase, Urine NEGATIVE NEGATIVE   Sars-CoV-2 and Influenza A/B PCR    Collection Time: 07/22/25 11:41 PM   Result Value Ref Range    Flu A Result Not Detected Not Detected    Flu B Result Not Detected Not Detected    Coronavirus 2019, PCR Not Detected Not Detected   Lactate    Collection Time: 07/23/25 12:43 AM   Result Value Ref Range    Lactate 0.7 0.4 - 2.0 mmol/L   Trichomonas Wet Prep Reflex to PCR    Collection Time: 07/23/25  1:05 AM   Result Value Ref Range    Trichomonas None Seen None Seen    Clue Cells None Seen None Seen    Yeast None Seen None Seen    WBC NONE SEEN     Trichomonas reflex comment       Trichomonas was not seen by wet prep. Reflex Trichomonas vaginalis by Amplified Detection.

## 2025-07-23 NOTE — DISCHARGE INSTRUCTIONS
Please return to the emergency room for any fevers, worsening vomiting, worsening abdominal pain, vaginal discharge or vaginal bleeding, worsening pelvic pain, contractions, or any worsening concerning symptoms or signs of infection.  Otherwise he will be discharged from here to our OB/GYN for fetal evaluation.

## 2025-07-23 NOTE — ED PROVIDER NOTES
EMERGENCY DEPARTMENT ENCOUNTER      Pt Name: Joey Cortes  MRN: [id number]  Birthdate 1995  Date of evaluation: 7/22/2025  Provider: Perez Rodríguez MD    CHIEF COMPLAINT       Chief Complaint   Patient presents with    Headache     Back pain, pelvic pain, 25 weeks pregnant, just started today, OB doc told her to come in    Pelvic Pain     HISTORY OF PRESENT ILLNESS    Joey Cortes is a 30 y.o. year old female who presents to the ER for headache, back and pelvic pain.  Patient is approximately 25 weeks pregnant.  The patient reports that her symptoms started today.  It began with a headache, she reports that as all over the head and not similar to her prior migraines.  She reports low back pain that is not radiating.  She reports pain in the lower abdomen and pelvis that she describes as cramping.  She denies internal pain, she denies vaginal pain.  She denies vaginal discharge or bleeding.  The patient saw her OB/GYN 2 weeks ago for a normal exam.  She has a history of cervical insufficiency, she was getting regular checkups for her cervix by OB/GYN.  Her daughter had hand-foot-and-mouth disease.  The patient denies any rash.  She reports nausea which she has been taking her Zofran for.  She reports that her nausea has been throughout her pregnancy.  PAST MEDICAL HISTORY   Medical History[1]  CURRENT MEDICATIONS       Previous Medications    ALBUTEROL 90 MCG/ACTUATION INHALER    INHALE 2 PUFFS BY MOUTH EVERY 4 HOURS IF NEEDED FOR WHEEZING.    ASPIRIN 81 MG CHEWABLE TABLET        BUDESONIDE-GLYCOPYR-FORMOTEROL (BREZTRI AEROSPHERE) 160-9-4.8 MCG/ACTUATION HFA AEROSOL INHALER    Inhale 2 puffs 2 times a day. Rinse mouth with water after use to reduce aftertaste and incidence of candidiasis. Do not swallow.    FAMOTIDINE (PEPCID) 20 MG TABLET    Take 1 tablet (20 mg) by mouth 2 times a day as needed for heartburn.    METOCLOPRAMIDE (REGLAN) 10 MG TABLET    Take 1 tablet (10 mg)  by mouth 3 times a day as needed (nausea/vomiting).    MONTELUKAST (SINGULAIR) 10 MG TABLET    Take 1 tablet (10 mg) by mouth once daily at bedtime.    PRENATAL VITAMIN PLUS LOW IRON 27 MG IRON- 1 MG TABLET         SURGICAL HISTORY     Surgical History[2]  ALLERGIES     Ibuprofen, Acetaminophen-caff-pyrilamine, Acetaminophen-pamabrom, Midol (acetaminoph-pyrilamine), and Pamabrom  FAMILY HISTORY     Family History[3]  SOCIAL HISTORY     Social History[4]  PHYSICAL EXAM  (up to 7 for level 4, 8 or more for level 5)     ED Triage Vitals   Temperature Heart Rate Respirations BP   07/22/25 2119 07/22/25 2119 07/22/25 2119 07/22/25 2119   37.2 °C (99 °F) (!) 119 16 118/71      Pulse Ox Temp Source Heart Rate Source Patient Position   07/22/25 2119 07/22/25 2119 07/22/25 2245 07/22/25 2245   98 % Temporal Monitor Lying      BP Location FiO2 (%)     07/22/25 2245 --     Left arm        Physical Exam  Vitals and nursing note reviewed.   Constitutional:       General: She is not in acute distress.     Appearance: She is well-developed.   HENT:      Head: Normocephalic and atraumatic.      Right Ear: External ear normal.      Left Ear: External ear normal.      Nose: Nose normal.      Mouth/Throat:      Mouth: Mucous membranes are moist.     Eyes:      Extraocular Movements: Extraocular movements intact.      Conjunctiva/sclera: Conjunctivae normal.      Pupils: Pupils are equal, round, and reactive to light.       Cardiovascular:      Rate and Rhythm: Normal rate and regular rhythm.      Heart sounds: No murmur heard.  Pulmonary:      Effort: Pulmonary effort is normal. No respiratory distress.      Breath sounds: Normal breath sounds.   Abdominal:      Palpations: Abdomen is soft.      Tenderness: There is no abdominal tenderness.     Musculoskeletal:         General: No swelling.      Cervical back: Neck supple.      Right lower leg: No edema.      Left lower leg: No edema.     Skin:     General: Skin is warm and dry.       Capillary Refill: Capillary refill takes less than 2 seconds.     Neurological:      General: No focal deficit present.      Mental Status: She is alert.     Psychiatric:         Mood and Affect: Mood normal.        DIAGNOSTIC RESULTS   LABS:  Labs Reviewed   CBC WITH AUTO DIFFERENTIAL - Abnormal       Result Value    WBC 18.6 (*)     nRBC 0.0      RBC 3.96 (*)     Hemoglobin 12.1      Hematocrit 36.2      MCV 91      MCH 30.6      MCHC 33.4      RDW 13.9      Platelets 242      Neutrophils % 81.4      Immature Granulocytes %, Automated 0.5      Lymphocytes % 7.8      Monocytes % 7.5      Eosinophils % 2.5      Basophils % 0.3      Neutrophils Absolute 15.12 (*)     Immature Granulocytes Absolute, Automated 0.10      Lymphocytes Absolute 1.45      Monocytes Absolute 1.39 (*)     Eosinophils Absolute 0.47      Basophils Absolute 0.06     COMPREHENSIVE METABOLIC PANEL - Abnormal    Glucose 94      Sodium 133 (*)     Potassium 3.8      Chloride 101      Bicarbonate 23      Anion Gap 13      Urea Nitrogen 3 (*)     Creatinine 0.49 (*)     eGFR >90      Calcium 8.9      Albumin 3.9      Alkaline Phosphatase 56      Total Protein 7.0      AST 16      Bilirubin, Total 0.4      ALT 20     HUMAN CHORIONIC GONADOTROPIN, SERUM QUANTITATIVE - Abnormal    HCG, Beta-Quantitative 12,256 (*)     Narrative:      Total HCG measurement is performed using the [employer] Access   Immunoassay which detects intact HCG and free beta HCG subunit.    This test is not indicated for use as a tumor marker.   HCG testing is performed using a different test methodology at [facility] ([facility]) than other [facility]. Direct result comparison   should only be made within the same method.       URINALYSIS WITH REFLEX CULTURE AND MICROSCOPIC - Abnormal    Color, Urine Light-Yellow      Appearance, Urine Turbid (*)     Specific Gravity, Urine 1.010      pH, Urine 5.5      Protein, Urine NEGATIVE      Glucose, Urine Normal      Blood,  Urine NEGATIVE      Ketones, Urine NEGATIVE      Bilirubin, Urine NEGATIVE      Urobilinogen, Urine Normal      Nitrite, Urine NEGATIVE      Leukocyte Esterase, Urine NEGATIVE     MAGNESIUM - Normal    Magnesium 1.66     SARS-COV-2 AND INFLUENZA A/B PCR - Normal    Flu A Result Not Detected      Flu B Result Not Detected      Coronavirus 2019, PCR Not Detected      Narrative:     This assay is an FDA-cleared, in vitro diagnostic nucleic acid amplification test for the qualitative detection and differentiation of SARS CoV-2/ Influenza A/B from nasopharyngeal specimens collected from individuals with signs and symptoms of respiratory tract infections, and has been validated for use at [facility]. Negative results do not preclude COVID-19/ Influenza A/B infections and should not be used as the sole basis for diagnosis, treatment, or other management decisions. Testing for SARS CoV-2 is recommended only for patients who meet current clinical and/or epidemiological criteria defined by federal, state, or local public health directives.   LACTATE - Normal    Lactate 0.7      Narrative:     Venipuncture immediately after or during the administration of Metamizole may lead to falsely low results. Testing should be performed immediately prior to Metamizole dosing.   GRAM STAIN FOR BACTERIAL VAGINOSIS/YEAST   BLOOD CULTURE   BLOOD CULTURE   TRICHOMONAS WET PREP REFLEX TO PCR    Trichomonas None Seen      Clue Cells None Seen      Yeast None Seen      WBC NONE SEEN      Trichomonas reflex comment        Value: Trichomonas was not seen by wet prep. Reflex Trichomonas vaginalis by Amplified Detection.   URINALYSIS WITH REFLEX CULTURE AND MICROSCOPIC    Narrative:     The following orders were created for panel order Urinalysis with Reflex Culture and Microscopic.  Procedure                               Abnormality         Status                     ---------                               -----------          ------                     Urinalysis with Reflex C...[[id number]]  Abnormal            Final result               Extra Urine Gray Tube[[id number]]                            In process                   Please view results for these tests on the individual orders.   EXTRA URINE GRAY TUBE   C. TRACHOMATIS / N. GONORRHOEAE, AMPLIFIED, UROGENITAL   TRICH VAGINALIS, AMPLIFIED     All other labs were within normal range or not returned as of this dictation.  Imaging  US OB limited 1+ fetuses   Final Result   Single live intrauterine pregnancy with estimated gestational age of   26 week and 0 day by femur length.        Please note the limited examination was performed for assessment of   fetal viability and fetal anatomy is not assessed. Comparison with   outpatient full anatomic fetal survey is recommended.             MACRO:   None        Signed by: Luis Miguel Marmolejo 7/23/2025 2:02 AM   Dictation workstation:   [id number]      Point of Care Ultrasound    (Results Pending)   US pelvis appendix    (Results Pending)      Procedure  Procedures  EMERGENCY DEPARTMENT COURSE/MDM:   Medical Decision Making    Vitals:    Vitals:    07/23/25 0000 07/23/25 0015 07/23/25 0030 07/23/25 0200   BP: 118/72  116/72    BP Location:       Patient Position:       Pulse: 96 91 96 89   Resp: (!) 23 (!) 25 20 (!) 23   Temp:       TempSrc:       SpO2: 97% 97% 98% 97%   Weight:       Height:         Joey Cortes is a female 30 y.o. who presents to the ER for headache, low back pain, pelvic pain. On arrival the patients vital signs were: Afebrile, regular heart rate, normotensive, regular respiration rate, normoxic on room air. History obtained from: patient.     Physical exam significant for a gravid uterus.  The patient reports tenderness to the right and left lower quadrant.  Fetal movement unremarkable.  Eyes are PERRL, no pain on eye movement.  No tenderness down the cervical spine. The heart is in regular rate and rhythm,  no murmurs appreciated on auscultation. The lungs are clear to auscultation, no rales, crackles, wheezing present.  The abdomen is soft and nontender, no masses or hernias appreciated. There is no voluntary guarding. Bowel sounds are within normal limits.  The skin is normal, no rashes or lesions.  There is no injury or edema in the lower extremities.    POCUS was performed, IUP was confirmed, baby's heart rate was approximately 159 bpm.    On independent assessment of the labs, CBC not show evidence of acute anemia.  White blood cell count was elevated at 18.6 with left shift of neutrophils at 15.12 and increased monocytes at 1.39.  CMP was within normal limits, no evidence of significant electrolyte abnormalities, VISHNU, or liver dysfunction.  Urinalysis was negative for UTI.  Vaginal swabs are negative for trichomonas, clue cells, yeast, white blood cells.  Viral swabs were negative for flu or coronavirus.    Patient was signed out to Dr. Sawyer at pending US results.  Please see the next provider's transition of care note for the remainder of the patient's care.        Diagnoses as of 07/24/25 0000   Acute nonintractable headache, unspecified headache type   Acute low back pain without sciatica, unspecified back pain laterality   Pelvic pain         External Records Reviewed: I reviewed recent and relevant outside records including inpatient notes, outpatient records      Shared decision making for disposition  Patient and/or patient´s representative was counseled regarding labs, imaging, likely diagnosis. All questions were answered. Recommendation was made   for discharge home. The patient agreed and was discharged home in stable condition with appropriate relevant educational materials. Return precautions were provided which included new or worsening abdominal pain, persistent vomiting, persistent diarrhea, black tar stools, fever of 100.4 (38C) or higher for 5-7 days, chest pain, shortness of breath, or  worsening of your current symptoms..     ED Medications administered this visit:    Medications   acetaminophen (Tylenol) tablet 650 mg (650 mg oral Given 7/23/25 0041)   sodium chloride 0.9 % bolus 500 mL (500 mL intravenous New Bag 7/23/25 0041)       New Prescriptions from this visit:    New Prescriptions    No medications on file        Final Impression:   1. Acute nonintractable headache, unspecified headache type    2. Acute low back pain without sciatica, unspecified back pain laterality          Please excuse any misspellings or unintended errors related to the Dragon speech recognition software used to dictate this note.    I reviewed the case with the attending ED physician. The attending ED physician agrees with the plan.          Perez Rodríguez MD  Resident  07/23/25 0206      The patient was seen by the resident/fellow.  I have personally performed a substantive portion of the encounter.  I have seen and examined the patient; agree with the workup, evaluation, MDM, management and diagnosis.  The care plan has been discussed with the resident; I have reviewed the resident’s note and agree with the documented findings.      At this time the patient received IV fluids, did not spike any fevers, received Tylenol for headache, the headache appeared better but it was not bad to begin with it felt like more of a tension headache.  Very vague in nature, and no symptoms such as blurry vision, neck stiffness, or light sensitivity.  The patient overall has very vague symptoms of muscle aches and I believe most likely is an early viral syndrome.  Ultrasound revealed no sign of appendicitis, with some slight right hydroureter hydronephrosis which can be seen in pregnancy.  Her urinalysis reveals no sign of bladder infection.  I called Dr. Hernandez, OB/gyn on call given her gestational age and abdominal/pelvic complaints, he has accepted her up in labor and delivery.   She will be discharged from the ED and taken  directly to L+D for evaluation.       Note the patient had tachycardia when she initially arrived to the emergency room however with rest, the tachycardia has improved as well as with fluid hydration.  Tachycardia is not unexpected at this stage pregnancy as well as increased respiratory rate, asthma leukocytosis.  She does have an 18,000 white count and normal lactic acid and leukocytosis can occur in pregnancy as well.  I did notify the patient that if she develops any worsening symptoms such as worsening fever, chest pain or shortness of breath, worsening abdominal pain, diarrhea, pain with urination vomiting, that she should return to the emergency room for reevaluation.  Otherwise she continue seeing her regular OB doctor and primary care doctor as directed.                                                                         Alon Soler,   07/24/25 0002         [1]   Past Medical History:  Diagnosis Date    Asthma     Baker's cyst N/a    Endometriosis     Headache    [2]   Past Surgical History:  Procedure Laterality Date    OTHER SURGICAL HISTORY  09/11/2020    Tonsillectomy    OTHER SURGICAL HISTORY  09/11/2020    Laparoscopy    TONSILLECTOMY     [3]   Family History  Problem Relation Name Age of Onset    Asthma Mother Danuta jackman     Heart failure Mother Danuta jackman     COPD Mother Danuta jackman     Congenital heart disease Mother Danuta jackman     Diabetes Maternal Grandfather Jose Miguel clark     Stroke Maternal Grandfather Jose Miguel clark     Diabetes Paternal Grandfather Rush chase     Cancer Paternal Grandmother Evelyn soria     Thyroid disease Paternal Grandmother Evelyn soria     Seizures Brother Gabe     Epilepsy Brother Gabe     Arthritis Brother Jonel     Learning disabilities Brother Jonel     COPD Mother Danuta jackman     Heart failure Mother Danuta jackman     Asthma Mother Danuta jackman     Heart disease Mother Danuta jackman     Congenital heart disease Mother Danuta jackman     Cancer Paternal  Grandmother Evelyn sandersmarbella     Thyroid disease Paternal Grandmother Evelyn soria     Diabetes Maternal Grandfather Jose Miguel rodriguezradha     Stroke Maternal Grandfather Jose Miguel clark     Diabetes Paternal Grandfather Rush chase     Hearing loss Father Gerald agudeloler    [4]   Social History  Tobacco Use    Smoking status: Former     Current packs/day: 0.00     Types: Cigarettes     Quit date:      Years since quittin.5    Smokeless tobacco: Never   Vaping Use    Vaping status: Never Used   Substance Use Topics    Alcohol use: Never    Drug use: Never        Alon Soler DO  25 0004

## 2025-07-24 LAB
CLUE CELLS VAG LPF-#/AREA: NORMAL /[LPF]
NUGENT SCORE: 0 (ref ?–6)
YEAST VAG WET PREP-#/AREA: NORMAL

## 2025-07-27 LAB
BACTERIA BLD CULT: NORMAL
BACTERIA BLD CULT: NORMAL

## 2025-07-28 ENCOUNTER — ANCILLARY PROCEDURE (OUTPATIENT)
Dept: CARDIOLOGY | Facility: HOSPITAL | Age: 30
End: 2025-07-28
Payer: COMMERCIAL

## 2025-07-28 DIAGNOSIS — O99.511 ASTHMA AFFECTING PREGNANCY IN FIRST TRIMESTER (HHS-HCC): ICD-10-CM

## 2025-07-28 DIAGNOSIS — J45.909 ASTHMA AFFECTING PREGNANCY IN FIRST TRIMESTER (HHS-HCC): ICD-10-CM

## 2025-07-28 DIAGNOSIS — R06.02 SHORTNESS OF BREATH: ICD-10-CM

## 2025-07-28 LAB
AORTIC VALVE MEAN GRADIENT: 4 MMHG
AORTIC VALVE PEAK VELOCITY: 1.1 M/S
AV PEAK GRADIENT: 5 MMHG
AVA (PEAK VEL): 2.91 CM2
AVA (VTI): 2.55 CM2
EJECTION FRACTION APICAL 4 CHAMBER: 75.1
EJECTION FRACTION: 65 %
LEFT VENTRICLE INTERNAL DIMENSION DIASTOLE: 5.11 CM (ref 3.5–6)
LEFT VENTRICULAR OUTFLOW TRACT DIAMETER: 2.12 CM
LV EJECTION FRACTION BIPLANE: 73 %
MITRAL VALVE E/A RATIO: 1.3
MITRAL VALVE E/E' RATIO: 4.39
RIGHT VENTRICLE FREE WALL PEAK S': 11.67 CM/S
TRICUSPID ANNULAR PLANE SYSTOLIC EXCURSION: 1.7 CM

## 2025-07-28 PROCEDURE — 93306 TTE W/DOPPLER COMPLETE: CPT

## 2025-07-28 PROCEDURE — 93306 TTE W/DOPPLER COMPLETE: CPT | Performed by: INTERNAL MEDICINE

## 2025-08-01 ASSESSMENT — ENCOUNTER SYMPTOMS
SHORTNESS OF BREATH: 1
SINUS PRESSURE: 1
COUGH: 0
RHINORRHEA: 1

## 2025-08-01 NOTE — PROGRESS NOTES
Patient: Joey Cortes    [id number]  : 1995 -- AGE 30 y.o.    Provider: COOPER Alanis-CNP     Location [facility]   Service Date: 2025              [facility]  New Visit Note      HISTORY OF PRESENT ILLNESS     The patient's referring provider is: Yanni Chacon, AP*    HISTORY OF PRESENT ILLNESS   Joey Cortes is a 30 y.o. female who presents to a [facility] for an evaluation with concerns of Follow-up, Results (ECHO), and Shortness of Breath. I have independently interviewed and examined the patient in the office and reviewed available records.          Current History 2025    On today's visit, the patient reports she is pregnant 14 weeks pregnant, she is having wheezing and shortness of breath.   She is on singulair and albuterol nebs and breztri  Diagnosed with asthma in  -  She is a former smoker 10 years x 1 ppd   Did not have respiratory concerns with previous 2 pregnancies.   She had an inhaler in high school to use during exercise       At baseline,  has dyspnea on exertion and at rest, especially if she is warm. She tried using her inahler but the solution is not aerolizing.  Currently sits for most of the day, works inside the house, but does not carry loads and do strenuous exercise. Has to stop for breath after walking about 100 meters or a few minutes (mMRC 3). Denies orthopnea, pnd, or precious.   Weight has been mostly stable.  Relates dry chronic cough mostly at night, c/o wheezing, and denies green, blood streaks sputum. C/o night cough. No hemoptysis. No fever or shivering chills. Has a runny nose, and a tingling sensation in the back of his throat.  Also complains of heartburn. Denies chest pain    Previous pulmonary history:   previously was told they have asthma and episode of bronchitis and PNA not on O2     Inhalers/nebulized medications:  albuterol and singular     Hospitalization History: Not been hospitalized over the last year for breathing related problem.    Sleep history:  Denies snoring, apnea, feeling tired during the day or taking naps during the day.       7/7/2025  On today's visit, the patient reports feeling lightheaded with heat and humidity, trying to stay indoors. She has 3 children playing baseball outside. She is winded after a couple minutes [facility] 3   She is 22 weeks pregnant.  Denies cough, occ wheezing.  Using rescue inhaler 1 time per week. She is taking her pulse ox - at worst at 80-75% Has known nasal polyps - sees Dr John   Denies chest tightness.   Awakens 1-2 nights and do a nebulizer,.   ACT score is 11     8/7/2025    On today's visit, the patient reports ENT diagnosed her with rhinitis - OB needs to approve her plan for would be acceptable while pregnant: 1 spray of Afrin, wait 10 minutes then 2 sprays of nasal steroid. In particular would like OB to okay use of Afrin during pregnancy. Currently she has not started  She is feeling tired. Not sleeping hard to sleep due to uncomfortable  She is 27 weeks   In the am she is coughing to clear her PND, not wheezing, no chest tightness   Has not used nebs, only 1 episode in the middle of her night she can wake up dyspneic   Not using rescue inhaler   ACT score   She is dyspneic with steps/stairs  She is unable to distinguish rhinitis / PND vs asthma    ALLERGIES AND MEDICATIONS     ALLERGIES  Allergies[1]    MEDICATIONS  Current Medications[2]      PAST HISTORY     PAST MEDICAL HISTORY  She  has a past medical history of Asthma, Baker's cyst (N/a), Endometriosis, and Headache.    PAST SURGICAL HISTORY  Surgical History[3]    IMMUNIZATION HISTORY  Immunization History   Administered Date(s) Administered    Moderna SARS-CoV-2 Vaccination 12/30/2022       SOCIAL HISTORY  She  reports that she quit smoking about 4 years ago. Her smoking use included cigarettes. She has never used  smokeless tobacco. She reports that she does not drink alcohol and does not use drugs. She Patient   She is a former smoker 10 years x 1 ppd quit 4 yrs ago    OCCUPATIONAL/ENVIRONMENTAL HISTORY  Currently works as: [employer]   DOES/DOES NOT EC: does not have known exposure to asbestos, silica, beryllium or inhaled metals.  DOES/DOES NOT EC: does not have exposure to birds or exotic animals.    FAMILY HISTORY  Family History[4]  DOES/DOES NOT EC: does have a family history of pulmonary disease. Grandmother paternal lung cancer and throat cancer   DOES/DOES NOT EC: does have a family history of cancer.  DOES/DOES NOT EC: does not have a family history of autoimmune disorders.    RESULTS/DATA     Pulmonary Function Test Results           Pulmonary Function Test - Scan on 6/19/2025  5:00 PM                  Chest Radiograph     XR chest 2 views 05/12/2024 - Impression - Stable exam with no acute radiographic abnormality.    Chest CT Scan     No testing done       Echocardiogram     TRANSTHORACIC ECHOCARDIOGRAM REPORT 7/28/2025     Patient Name:       LAURENT ALMANZA    Reading Physician:    [id number] Stephen ASHLEY MD, [facility]  Study Date:         7/28/2025           Ordering Provider:    [id number] VIRGINIA HOPSON       PHYSICIAN INTERPRETATION:  Left Ventricle: The left ventricular systolic function is normal with a visually estimated ejection fraction of 65%. Wall motion is abnormal. The left ventricular cavity size is normal. There is normal septal and normal posterior left ventricular wall thickness. Spectral Doppler shows a normal pattern of left ventricular diastolic filling. The basilar portion of inferior wall seems mildly hypokinetic. may be artifactual as not optimally visuallized. Consider echo with contrast or cardiac mri to better define.  Left Atrium: The left atrial size is  normal.  Right Ventricle: The right ventricle is normal in size. There is normal right ventricular global systolic function.  Right Atrium: The right atrial size is normal.  Aortic Valve: The aortic valve is trileaflet. The aortic valve area by VTI is 2.55 cmï¿½ with a peak velocity of 1.10 m/s. The peak and mean gradients are 5 mmHg and 4 mmHg, respectively, with a dimensionless index of 0.72. There is no evidence of aortic valve regurgitation.  Mitral Valve: The mitral valve is normal in structure. The doppler estimated peak and mean diastolic gradients are 3 mmHg and 2 mmHg, respectively. There is no evidence of mitral valve regurgitation. The E Vmax is 0.59 m/s.  Tricuspid Valve: The tricuspid valve is structurally normal. No evidence of tricuspid regurgitation. The right ventricular systolic pressure could not be estimated.  Pulmonic Valve: The pulmonic valve is structurally normal. There is trace pulmonic valve regurgitation.  Pericardium: No pericardial effusion noted.  Aorta: The aortic root is normal.        CONCLUSIONS:   1. The left ventricular systolic function is normal with a visually estimated ejection fraction of 65%.   2. Abnormal wall motion.   3. The basilar portion of inferior wall seems mildly hypokinetic. may be artifactual as not optimally visuallized. Consider echo with contrast or cardiac mri to better define.   4. There is normal right ventricular global systolic function.   5. Normal valve structure and function.   6. The right ventricular systolic pressure could not be estimated.        TRICUSPID VALVE/RVSP:         Normal Ranges:  Est. RA Pressure:     3 mmHg  IVC Diam:             1.75 cm      REVIEW OF SYSTEMS     REVIEW OF SYSTEMS  Review of Systems   HENT:  Positive for rhinorrhea and sinus pressure.    Respiratory:  Positive for shortness of breath. Negative for cough and wheezing.          PHYSICAL EXAM     VITAL SIGNS: /78   Pulse 95   Temp 37 °C (98.6 °F)   Resp 20   Ht  "1.6 m (5' 3\")   Wt 85.5 kg (188 lb 9.6 oz)   LMP 01/31/2025 (Exact Date)   SpO2 96%   BMI 33.41 kg/m²  /78   Pulse 95   Temp 37 °C (98.6 °F)   Resp 20   Ht 1.6 m (5' 3\")   Wt 85.5 kg (188 lb 9.6 oz)   LMP 01/31/2025 (Exact Date)   SpO2 96%   BMI 33.41 kg/m²      CURRENT WEIGHT: [unfilled]  BMI: [unfilled]  PREVIOUS WEIGHTS:  Wt Readings from Last 3 Encounters:   08/07/25 85.5 kg (188 lb 9.6 oz)   08/05/25 86.6 kg (191 lb)   07/23/25 86.2 kg (190 lb)       Physical Exam  Constitutional:       Appearance: Normal appearance.   HENT:      Head: Normocephalic and atraumatic.      Right Ear: External ear normal.      Left Ear: External ear normal.      Nose: Nose normal.      Mouth/Throat:      Mouth: Mucous membranes are moist.      Pharynx: Oropharynx is clear.     Eyes:      Extraocular Movements: Extraocular movements intact.      Conjunctiva/sclera: Conjunctivae normal.      Pupils: Pupils are equal, round, and reactive to light.       Cardiovascular:      Rate and Rhythm: Normal rate and regular rhythm.      Pulses: Normal pulses.      Heart sounds: Normal heart sounds.   Pulmonary:      Effort: Pulmonary effort is normal.      Breath sounds: Normal breath sounds.   Abdominal:      General: Bowel sounds are normal.      Palpations: Abdomen is soft.     Musculoskeletal:         General: Normal range of motion.      Cervical back: Normal range of motion and neck supple.     Skin:     General: Skin is warm and dry.     Neurological:      General: No focal deficit present.      Mental Status: She is alert and oriented to person, place, and time. Mental status is at baseline.     Psychiatric:         Mood and Affect: Mood normal.         Behavior: Behavior normal.         Thought Content: Thought content normal.         Judgment: Judgment normal.         ASSESSMENT/PLAN     Ms. Shauna Cortes is a 30 y.o. female and  has a past medical history of Asthma, Baker's cyst (N/a), Endometriosis, and Headache. She " was referred to the [facility] for evaluation of asthma affecting pregnancy     Problem List and Orders      Assessment and Plan / Recommendations:  Problem List Items Addressed This Visit       Asthma affecting pregnancy in first trimester ([facility])    Overview   - uses Breztri inhaler BID (combination inhaler including budesonide, glycopyrrolate, and formoterol)  - Has rescue inhaler but rarely uses it   - seeing pulmonology this pregnancy             Other Visit Diagnoses         Shortness of breath                      Dyspnea 2/2 asthma   Previous PFT in 2023 showing normal Spirometry, +BD response, lung volumes shows air trapping  Diffusion capacity is normal  -  pulmonary function test with normal spirometry  by lung volumes indicate restrictive disease, FENO 16  and 6 minute walk test  - consider CT chest after pregnancy   - albuterol hfa 2 puffs or albuterol nebs every 4-6 hours as needed      - Eosinophils 4/2024 - 880, repeat CBC with diff reveal 460 and negative RAST   - patient has been on Breztri x 1 year continue, cont 2 puffs twice a day and rinse and spit - prescription is reporting needs prior auth but has been on for 1 year   - cont singulair    - echo with bubble and strain - normal LVEF but with Abnormal wall motion. Can consider repeating after pregnancy     Hypophayrnx - had previous sleep study that was negative for IRVIN     Check overnight pulse ox as waking up shortness of breath     Has nasal polyps seen by Dr. John - ENT diagnosed her with rhinitis - OB needs to approve her plan for would be acceptable while pregnant: 1 spray of Afrin, wait 10 minutes then 2 sprays of nasal steroid. In particular would like OB to okay use of Afrin during pregnancy. Currently she has not started - discussed reaching out to her OB       Thank you for visiting the Pulmonary clinic today!       Return to clinic after 6 weeks and  sooner if needed   Yanni Chacon CNP  My  office number is [phone] -     Call to schedule  for radiology - CT scans/PFTs etc at  [phone]  General scheduling  [phone]     Best way to get a hold of me is to call my office --> Please do not send me follow my health messages  Any test results will be discussed at next visit -- please make sure to make a follow up appt after testing.                   [1]   Allergies  Allergen Reactions    Ibuprofen Rash, Shortness of breath, Unknown, GI Upset and Other     abd pain  vaginal bleeding    Acetaminophen-Caff-Pyrilamine Hives     midol    Acetaminophen-Pamabrom Hives and Itching    Midol (Acetaminoph-Pyrilamine) Unknown    Pamabrom Other   [2]   Current Outpatient Medications   Medication Sig Dispense Refill    albuterol 90 mcg/actuation inhaler INHALE 2 PUFFS BY MOUTH EVERY 4 HOURS IF NEEDED FOR WHEEZING. 18 g 11    aspirin 81 mg chewable tablet       budesonide-glycopyr-formoterol (Breztri Aerosphere) 160-9-4.8 mcg/actuation HFA aerosol inhaler Inhale 2 puffs 2 times a day. Rinse mouth with water after use to reduce aftertaste and incidence of candidiasis. Do not swallow. 24 g 3    famotidine (Pepcid) 20 mg tablet Take 1 tablet (20 mg) by mouth 2 times a day as needed for heartburn. 60 tablet 5    montelukast (Singulair) 10 mg tablet Take 1 tablet (10 mg) by mouth once daily at bedtime.      ondansetron (Zofran) 4 mg tablet Take 1 tablet (4 mg) by mouth every 8 hours if needed for moderate pain (4 - 6).      prenatal vitamin calcium-iron-folic (Prenatal Vitamin Plus Low Iron) 27 mg iron- 1 mg tablet Take 1 tablet by mouth once daily. 30 tablet 6    metoclopramide (Reglan) 10 mg tablet Take 1 tablet (10 mg) by mouth 3 times a day as needed (nausea/vomiting). 30 tablet 2     No current facility-administered medications for this visit.   [3]   Past Surgical History:  Procedure Laterality Date    OTHER SURGICAL HISTORY  09/11/2020    Tonsillectomy    OTHER SURGICAL HISTORY  09/11/2020    Laparoscopy     TONSILLECTOMY     [4]   Family History  Problem Relation Name Age of Onset    Asthma Mother Danuta jackman     Heart failure Mother Danuta jackman     COPD Mother Danuta jackman     Congenital heart disease Mother Danuta jackman     Diabetes Maternal Grandfather Jose Miguel clark     Stroke Maternal Grandfather Jose Miguel clark     Diabetes Paternal Grandfather Rush chase     Cancer Paternal Grandmother Evelyn soria     Thyroid disease Paternal Grandmother Evelyn yang     Seizures Brother Gabe     Epilepsy Brother Gabe     Arthritis Brother Jonel     Learning disabilities Brother Jonel     COPD Mother Danuta jackman     Heart failure Mother Danuta jackman     Asthma Mother Danuta jackman     Heart disease Mother Danuta jackman     Congenital heart disease Mother Danuta jackman     Cancer Paternal Grandmother Evelyn soria     Thyroid disease Paternal Grandmother Evelyn soria     Diabetes Maternal Grandfather Jose Miguel clark     Stroke Maternal Grandfather Jose Miguel clark     Diabetes Paternal Grandfather Rush chase     Hearing loss Father Gerald chase

## 2025-08-04 LAB
ATRIAL RATE: 92 BPM
P AXIS: 14 DEGREES
P OFFSET: 190 MS
P ONSET: 147 MS
PR INTERVAL: 138 MS
Q ONSET: 216 MS
QRS COUNT: 15 BEATS
QRS DURATION: 88 MS
QT INTERVAL: 370 MS
QTC CALCULATION(BAZETT): 457 MS
QTC FREDERICIA: 426 MS
R AXIS: 25 DEGREES
T AXIS: 10 DEGREES
T OFFSET: 401 MS
VENTRICULAR RATE: 92 BPM

## 2025-08-05 ENCOUNTER — APPOINTMENT (OUTPATIENT)
Dept: OBSTETRICS AND GYNECOLOGY | Facility: CLINIC | Age: 30
End: 2025-08-05
Payer: COMMERCIAL

## 2025-08-05 VITALS — BODY MASS INDEX: 33.83 KG/M2 | WEIGHT: 191 LBS | SYSTOLIC BLOOD PRESSURE: 109 MMHG | DIASTOLIC BLOOD PRESSURE: 75 MMHG

## 2025-08-05 DIAGNOSIS — O99.210 OBESITY IN PREGNANCY (HHS-HCC): ICD-10-CM

## 2025-08-05 DIAGNOSIS — Z87.51 HISTORY OF PRETERM DELIVERY: ICD-10-CM

## 2025-08-05 DIAGNOSIS — Z3A.26 26 WEEKS GESTATION OF PREGNANCY (HHS-HCC): Primary | ICD-10-CM

## 2025-08-05 DIAGNOSIS — Z13.1 SCREENING FOR DIABETES MELLITUS: ICD-10-CM

## 2025-08-05 DIAGNOSIS — O99.511 ASTHMA AFFECTING PREGNANCY IN FIRST TRIMESTER (HHS-HCC): ICD-10-CM

## 2025-08-05 DIAGNOSIS — J45.909 ASTHMA AFFECTING PREGNANCY IN FIRST TRIMESTER (HHS-HCC): ICD-10-CM

## 2025-08-05 PROCEDURE — 85027 COMPLETE CBC AUTOMATED: CPT

## 2025-08-05 PROCEDURE — 99213 OFFICE O/P EST LOW 20 MIN: CPT | Performed by: OBSTETRICS & GYNECOLOGY

## 2025-08-05 RX ORDER — ONDANSETRON 4 MG/1
1 TABLET, FILM COATED ORAL EVERY 8 HOURS PRN
COMMUNITY
Start: 2025-07-02

## 2025-08-05 RX ORDER — PRENATAL WITH FERROUS FUM AND FOLIC ACID 3080; 920; 120; 400; 22; 1.84; 3; 20; 10; 1; 12; 200; 27; 25; 2 [IU]/1; [IU]/1; MG/1; [IU]/1; MG/1; MG/1; MG/1; MG/1; MG/1; MG/1; UG/1; MG/1; MG/1; MG/1; MG/1
1 TABLET ORAL DAILY
Qty: 30 TABLET | Refills: 6 | Status: SHIPPED | OUTPATIENT
Start: 2025-08-05

## 2025-08-05 RX ORDER — PRENATAL WITH FERROUS FUM AND FOLIC ACID 3080; 920; 120; 400; 22; 1.84; 3; 20; 10; 1; 12; 200; 27; 25; 2 [IU]/1; [IU]/1; MG/1; [IU]/1; MG/1; MG/1; MG/1; MG/1; MG/1; MG/1; UG/1; MG/1; MG/1; MG/1; MG/1
1 TABLET ORAL DAILY
Qty: 30 TABLET | Refills: 3 | Status: SHIPPED | OUTPATIENT
Start: 2025-08-05 | End: 2025-08-05 | Stop reason: SDUPTHER

## 2025-08-05 ASSESSMENT — EDINBURGH POSTNATAL DEPRESSION SCALE (EPDS)
I HAVE BEEN ABLE TO LAUGH AND SEE THE FUNNY SIDE OF THINGS: AS MUCH AS I ALWAYS COULD
I HAVE FELT SAD OR MISERABLE: NO, NOT AT ALL
I HAVE BLAMED MYSELF UNNECESSARILY WHEN THINGS WENT WRONG: NO, NEVER
I HAVE FELT SCARED OR PANICKY FOR NO GOOD REASON: NO, NOT AT ALL
I HAVE LOOKED FORWARD WITH ENJOYMENT TO THINGS: AS MUCH AS I EVER DID
THE THOUGHT OF HARMING MYSELF HAS OCCURRED TO ME: NEVER
I HAVE BEEN ANXIOUS OR WORRIED FOR NO GOOD REASON: NO, NOT AT ALL
THINGS HAVE BEEN GETTING ON TOP OF ME: NO, I HAVE BEEN COPING AS WELL AS EVER
TOTAL SCORE: 0
I HAVE BEEN SO UNHAPPY THAT I HAVE BEEN CRYING: NO, NEVER
I HAVE BEEN SO UNHAPPY THAT I HAVE HAD DIFFICULTY SLEEPING: NOT AT ALL

## 2025-08-05 NOTE — PROGRESS NOTES
Patient presents for OBFU  GTT/CBC/Syph today  EPDS = 0  C/O: None     Nehal Martinez MA II    Routine prenatal visit     Subjective    HPI:  Doing OK overall. Felt pains in lower abdomen/hip but these resolved.  Glucola/CBC/syphilis screen today.  Discussed Tdap - unsure if she will get this but will discuss again next visit.  Saw pulmonology and ENT.  Has pulm follow up later this week.     Objective    Vital Signs  /75   Wt 86.6 kg (191 lb)   LMP 2025 (Exact Date)   BMI 33.83 kg/m²     Joey Cortes is a 30 y.o. yo  at 26w4d here for the following concerns which we addressed today:     Medical Problems       Problem List       Nasal obstruction    Nasal polyp    Hypertrophy of both inferior nasal turbinates    DNS (deviated nasal septum)    26 weeks gestation of pregnancy ([facility])    Overview Addendum 2025 10:59 AM by Nusrat Villegas MD   Desired provider in labor: [] CNM  [x] Physician   [] Either Acceptable  [x] Blood Products: [x] Yes, accepts [] No, needs counseling  [x] Initial BMI: 31.40   [x] Prenatal Labs: UTD  [x] Cervical Cancer Screening up to date: normal, negative HPV    [x] Rh status: positive   [x] Screen for IPV and Substance Use Risk  [x] Genetic Screening (cfDNA): rr cfDNA screening, it's a GIRL   [x] First Trimester Anatomy Screen (11-13.6 wks): WNL   [x] Baby ASA: Recommended - pt declines   [x] Pregnancy dated by: sure LMP c/w 9 week USN     [x] Anatomy US: (19-20 wks): WNL  [] Federal Sterilization consent signed (if indicated):  [] 1hr GCT at 24-28wks: done today    [] Fetal Surveillance (if indicated):  [] Tdap: next visit   [] RSV (32-36 wks) (Sept. to end of ):     [] Feeding Intentions:  [] Postpartum Birth control method:   [] GBS at 36 - 37 wks:  [] 39 weeks discussion of IOL vs. Expectant management:  [] Mode of delivery ( anticipated ):           Obesity in pregnancy ([facility])    Overview Signed 2025  1:46 PM by Nusrat  DIANA Villegas MD   - BMI 32 at first visit          History of  delivery    Overview Addendum 2025  2:41 PM by Nusrat Villegas MD   - PTL and unintentional home delivery at 33.0 in first pregnancy  - term  (37.2) with second pregnancy - did receive 17-OHP during this pregnancy   - normal CL to date - has another one scheduled          Asthma affecting pregnancy in first trimester ([facility])    Overview Addendum 2025 10:58 AM by Nusrat Villegas MD   - uses Breztri inhaler BID (combination inhaler including budesonide, glycopyrrolate, and formoterol)  - Has rescue inhaler but rarely uses it   - seeing pulmonology this pregnancy            Carpal tunnel syndrome, right upper limb        Follow up in 4 week(s).

## 2025-08-06 ENCOUNTER — TELEPHONE (OUTPATIENT)
Dept: OBSTETRICS AND GYNECOLOGY | Facility: CLINIC | Age: 30
End: 2025-08-06

## 2025-08-06 DIAGNOSIS — R73.09 ELEVATED GLUCOSE TOLERANCE TEST: ICD-10-CM

## 2025-08-06 LAB
ERYTHROCYTE [DISTWIDTH] IN BLOOD BY AUTOMATED COUNT: 14.3 % (ref 11.5–14.5)
HCT VFR BLD AUTO: 38.1 % (ref 36–46)
HGB BLD-MCNC: 11.4 G/DL (ref 12–16)
MCH RBC QN AUTO: 30.6 PG (ref 26–34)
MCHC RBC AUTO-ENTMCNC: 29.9 G/DL (ref 32–36)
MCV RBC AUTO: 102 FL (ref 80–100)
NRBC BLD-RTO: 0 /100 WBCS (ref 0–0)
PLATELET # BLD AUTO: 246 X10*3/UL (ref 150–450)
RBC # BLD AUTO: 3.73 X10*6/UL (ref 4–5.2)
REFLEX ADDED, ANEMIA PANEL: NORMAL
WBC # BLD AUTO: 13 X10*3/UL (ref 4.4–11.3)

## 2025-08-07 ENCOUNTER — APPOINTMENT (OUTPATIENT)
Facility: CLINIC | Age: 30
End: 2025-08-07
Payer: COMMERCIAL

## 2025-08-07 VITALS
DIASTOLIC BLOOD PRESSURE: 78 MMHG | RESPIRATION RATE: 20 BRPM | OXYGEN SATURATION: 96 % | HEIGHT: 63 IN | HEART RATE: 95 BPM | TEMPERATURE: 98.6 F | SYSTOLIC BLOOD PRESSURE: 113 MMHG | WEIGHT: 188.6 LBS | BODY MASS INDEX: 33.42 KG/M2

## 2025-08-07 DIAGNOSIS — O99.511 ASTHMA AFFECTING PREGNANCY IN FIRST TRIMESTER (HHS-HCC): ICD-10-CM

## 2025-08-07 DIAGNOSIS — R06.02 SHORTNESS OF BREATH: ICD-10-CM

## 2025-08-07 DIAGNOSIS — J45.909 ASTHMA AFFECTING PREGNANCY IN FIRST TRIMESTER (HHS-HCC): ICD-10-CM

## 2025-08-07 PROCEDURE — 99214 OFFICE O/P EST MOD 30 MIN: CPT | Performed by: NURSE PRACTITIONER

## 2025-08-07 PROCEDURE — 3008F BODY MASS INDEX DOCD: CPT | Performed by: NURSE PRACTITIONER

## 2025-08-07 ASSESSMENT — PATIENT HEALTH QUESTIONNAIRE - PHQ9
1. LITTLE INTEREST OR PLEASURE IN DOING THINGS: NOT AT ALL
2. FEELING DOWN, DEPRESSED OR HOPELESS: NOT AT ALL
SUM OF ALL RESPONSES TO PHQ9 QUESTIONS 1 AND 2: 0

## 2025-08-07 ASSESSMENT — ENCOUNTER SYMPTOMS: WHEEZING: 0

## 2025-08-07 ASSESSMENT — COLUMBIA-SUICIDE SEVERITY RATING SCALE - C-SSRS
1. IN THE PAST MONTH, HAVE YOU WISHED YOU WERE DEAD OR WISHED YOU COULD GO TO SLEEP AND NOT WAKE UP?: NO
6. HAVE YOU EVER DONE ANYTHING, STARTED TO DO ANYTHING, OR PREPARED TO DO ANYTHING TO END YOUR LIFE?: NO
2. HAVE YOU ACTUALLY HAD ANY THOUGHTS OF KILLING YOURSELF?: NO

## 2025-08-07 NOTE — PATIENT INSTRUCTIONS
Dyspnea 2/2 asthma   Previous PFT in 2023 showing normal Spirometry, +BD response, lung volumes shows air trapping  Diffusion capacity is normal  -  pulmonary function test with normal spirometry  byt lung volumes indicate restrictive disease, FENO 16  and 6 minute walk test  - consider CT chest after pregnancy   - albuterol hfa 2 puffs or albuterol nebs every 4-6 hours as needed      - Eosinophils 4/2024 - 880, repeat CBC with diff reveal 460 and negative RAST   - patient has been on Breztri x 1 year continue, cont 2 puffs twice a day and rinse and spit - prescription is reporting needs prior auth but has been on for 1 year   - cont singulair    - echo with bubble and strain - normal LVEF but with Abnormal wall motion. Can consider repeating after pregnancy     Hypophayrnx - had previous sleep study that was negative for IRVIN     Check overnight pulse ox as waking up shortness of breath     Has nasal polyps seen by Dr. John - ENT diagnosed her with rhinitis - OB needs to approve her plan for would be acceptable while pregnant: 1 spray of Afrin, wait 10 minutes then 2 sprays of nasal steroid. In particular would like OB to okay use of Afrin during pregnancy. Currently she has not started - discussed reaching out to her OB       Thank you for visiting the Pulmonary clinic today!       Return to clinic after 4 weeks and  sooner if needed   Yanni Chacon CNP  My office number is [phone] -     Call to schedule  for radiology - CT scans/PFTs etc at  [phone]  General scheduling  [phone]     Best way to get a hold of me is to call my office --> Please do not send me follow my health messages  Any test results will be discussed at next visit -- please make sure to make a follow up appt after testing.

## 2025-08-09 LAB
GLUCOSE 1H P 50 G GLC PO SERPL-MCNC: 159 MG/DL
T PALLIDUM AB SER QL IA: NEGATIVE

## 2025-08-13 LAB
GLUCOSE 1H P CHAL SERPL-MCNC: 194 MG/DL
GLUCOSE 2H P CHAL SERPL-MCNC: 145 MG/DL
GLUCOSE 3H P 100 G GLC PO SERPL-MCNC: 89 MG/DL
GLUCOSE P FAST SERPL-MCNC: 87 MG/DL (ref 65–94)
SERVICE CMNT-IMP: ABNORMAL

## 2025-08-25 PROBLEM — Z3A.29 29 WEEKS GESTATION OF PREGNANCY (HHS-HCC): Status: ACTIVE | Noted: 2025-04-07

## 2025-08-26 ENCOUNTER — APPOINTMENT (OUTPATIENT)
Dept: OBSTETRICS AND GYNECOLOGY | Facility: CLINIC | Age: 30
End: 2025-08-26
Payer: COMMERCIAL

## 2025-08-26 VITALS — BODY MASS INDEX: 33.76 KG/M2 | WEIGHT: 190.6 LBS | SYSTOLIC BLOOD PRESSURE: 127 MMHG | DIASTOLIC BLOOD PRESSURE: 77 MMHG

## 2025-08-26 DIAGNOSIS — N89.8 VAGINAL DISCHARGE: ICD-10-CM

## 2025-08-26 DIAGNOSIS — Z23 NEED FOR VACCINATION: ICD-10-CM

## 2025-08-26 DIAGNOSIS — Z3A.29 29 WEEKS GESTATION OF PREGNANCY (HHS-HCC): Primary | ICD-10-CM

## 2025-08-26 DIAGNOSIS — Z28.21 TETANUS, DIPHTHERIA, AND ACELLULAR PERTUSSIS (TDAP) VACCINATION DECLINED: ICD-10-CM

## 2025-08-26 PROCEDURE — 99213 OFFICE O/P EST LOW 20 MIN: CPT | Performed by: STUDENT IN AN ORGANIZED HEALTH CARE EDUCATION/TRAINING PROGRAM

## 2025-08-27 LAB — BV SCORE VAG QL: NORMAL

## 2025-09-09 ENCOUNTER — APPOINTMENT (OUTPATIENT)
Dept: OBSTETRICS AND GYNECOLOGY | Facility: CLINIC | Age: 30
End: 2025-09-09
Payer: COMMERCIAL

## 2025-09-11 ENCOUNTER — APPOINTMENT (OUTPATIENT)
Dept: OBSTETRICS AND GYNECOLOGY | Facility: CLINIC | Age: 30
End: 2025-09-11
Payer: COMMERCIAL

## 2025-09-18 ENCOUNTER — APPOINTMENT (OUTPATIENT)
Facility: CLINIC | Age: 30
End: 2025-09-18
Payer: COMMERCIAL

## 2025-09-24 ENCOUNTER — APPOINTMENT (OUTPATIENT)
Dept: OBSTETRICS AND GYNECOLOGY | Facility: CLINIC | Age: 30
End: 2025-09-24
Payer: COMMERCIAL

## 2025-10-07 ENCOUNTER — APPOINTMENT (OUTPATIENT)
Dept: OBSTETRICS AND GYNECOLOGY | Facility: CLINIC | Age: 30
End: 2025-10-07
Payer: COMMERCIAL

## 2025-10-15 ENCOUNTER — APPOINTMENT (OUTPATIENT)
Dept: OBSTETRICS AND GYNECOLOGY | Facility: CLINIC | Age: 30
End: 2025-10-15
Payer: COMMERCIAL

## 2025-10-21 ENCOUNTER — APPOINTMENT (OUTPATIENT)
Dept: OBSTETRICS AND GYNECOLOGY | Facility: CLINIC | Age: 30
End: 2025-10-21
Payer: COMMERCIAL

## 2025-10-29 ENCOUNTER — APPOINTMENT (OUTPATIENT)
Dept: OBSTETRICS AND GYNECOLOGY | Facility: CLINIC | Age: 30
End: 2025-10-29
Payer: COMMERCIAL

## (undated) DEVICE — COVER,TABLE,44X90,STERILE: Brand: MEDLINE

## (undated) DEVICE — LABEL MED MINI W/ MARKER

## (undated) DEVICE — Device

## (undated) DEVICE — PAD N ADH W3XL4IN POLY COT SFT PERF FLM EASILY CUT ABSRB

## (undated) DEVICE — BANDAGE, GAUZE, 6 PLY, KERLIX, 4.5 IN X 4.1 YD, AMD, STERILE

## (undated) DEVICE — TUBING INSUFFLATION SMK EVAC HI FLO SET PNEUMOCLEAR

## (undated) DEVICE — TOWEL PACK, STERILE, 4/PACK, BLUE

## (undated) DEVICE — SUTURE, ETHILON, 4-0, BLK, MONO, PS-2 18

## (undated) DEVICE — SOLUTION, IRRIGATION, SODIUM CHLORIDE 0.9%, 1000 ML, POUR BOTTLE

## (undated) DEVICE — ADHESIVE SKIN CLSR 0.7ML TOP DERMBND ADV

## (undated) DEVICE — WARMER SCP 2 ANTIFOG LAP DISP

## (undated) DEVICE — LINER INCONT W7XL14IN PEACH POLYMER FLUF ADH WT CNTOUR DLX

## (undated) DEVICE — GOWN,AURORA,NONRNF,XL,30/CS: Brand: MEDLINE

## (undated) DEVICE — Z INACTIVE USE 2863041 SPONGE GZ W4XL4IN 100% COT 16 PLY RADPQ HIGHLY ABSRB

## (undated) DEVICE — TOWEL,OR,DSP,ST,BLUE,STD,4/PK,20PK/CS: Brand: MEDLINE

## (undated) DEVICE — APPLICATOR MEDICATED 26 CC SOLUTION HI LT ORNG CHLORAPREP

## (undated) DEVICE — GLOVE ORANGE PI 8   MSG9080

## (undated) DEVICE — KIT,ANTI FOG,W/SPONGE & FLUID,SOFT PACK: Brand: MEDLINE

## (undated) DEVICE — DRAPE, LAVH, STERILE: Brand: MEDLINE

## (undated) DEVICE — SKIN PREP TRAY 4 COMPARTM TRAY: Brand: MEDLINE INDUSTRIES, INC.

## (undated) DEVICE — GOWN,AURORA,NONREINFORCED,LARGE: Brand: MEDLINE

## (undated) DEVICE — SYRINGE MED 10ML LUERLOCK TIP W/O SFTY DISP

## (undated) DEVICE — COUNTER NDL 40 COUNT HLD 70 FOAM BLK ADH W/ MAG

## (undated) DEVICE — SYRINGE MED 30ML STD CLR PLAS LUERLOCK TIP N CTRL DISP

## (undated) DEVICE — Z INACTIVE USE 2863730 PACK BASIC SET UP II ECLIPSE W 3 3 QTR SHT 4 UTIL DRPS MAYO

## (undated) DEVICE — SOLUTION, IRRIGATION, STERILE WATER, 1000 ML, POUR BOTTLE

## (undated) DEVICE — COVER LT HNDL BLU PLAS

## (undated) DEVICE — KIT CANSTR VAC TANTEM TB FOR AQUILEX FLD CTRL SYS

## (undated) DEVICE — 4-PORT MANIFOLD: Brand: NEPTUNE 2

## (undated) DEVICE — GLOVE ORANGE PI 7 1/2   MSG9075

## (undated) DEVICE — DRESSING, GAUZE, SUPER KERLIX, 6X6

## (undated) DEVICE — APPLICATOR, CHLORAPREP, W/ORANGE TINT, 26ML

## (undated) DEVICE — GLOVE, SURGICAL, PROTEXIS PI MICRO, 7.5, PF, LF

## (undated) DEVICE — SUTURE VCRL SZ 4-0 L18IN ABSRB UD L19MM PS-2 3/8 CIR PRIM J496H

## (undated) DEVICE — ELECTRODE PT RET AD L9FT HI MOIST COND ADH HYDRGEL CORDED

## (undated) DEVICE — DRESSING, NON-ADHERENT, 3 X 3 IN, STERILE

## (undated) DEVICE — SET FLD CTRL SYS INFLO AND OUTFLO TB AQUILEX

## (undated) DEVICE — GLOVE, SURGICAL, PROTEXIS PI MICRO, 7.0, PF, LF

## (undated) DEVICE — BANDAGE, COHESIVE, HAND TEAR, COFLEX, 2 X 5 YDS, LF

## (undated) DEVICE — TROCAR: Brand: KII SLEEVE

## (undated) DEVICE — TROCAR: Brand: KII FIOS FIRST ENTRY

## (undated) DEVICE — POUCH, INSTRUMENT, 3POCKET, INVISISHIELD: Brand: MEDLINE

## (undated) DEVICE — DRAPE, SHEET, 17 X 23 IN

## (undated) DEVICE — BLADE,CARBON-STEEL,11,STRL,DISPOSABLE,TB: Brand: MEDLINE

## (undated) DEVICE — TOTAL TRAY, DB, 100% SILI FOLEY, 16FR 10: Brand: MEDLINE

## (undated) DEVICE — MANIPULATOR UTER INSTRUMENT ZUMI

## (undated) DEVICE — SUTURE, ETHILON, 3-0, 18 IN, PS2, BLACK